# Patient Record
Sex: FEMALE | Race: BLACK OR AFRICAN AMERICAN | Employment: FULL TIME | URBAN - METROPOLITAN AREA
[De-identification: names, ages, dates, MRNs, and addresses within clinical notes are randomized per-mention and may not be internally consistent; named-entity substitution may affect disease eponyms.]

---

## 2019-01-29 PROBLEM — Z34.03 PRIMIGRAVIDA IN THIRD TRIMESTER: Status: ACTIVE | Noted: 2019-01-29

## 2019-02-05 PROBLEM — F12.90 MARIJUANA USE: Status: ACTIVE | Noted: 2019-02-05

## 2019-02-06 ENCOUNTER — HOSPITAL ENCOUNTER (EMERGENCY)
Age: 19
Discharge: HOME | DRG: 560 | End: 2019-02-07
Attending: EMERGENCY MEDICINE
Payer: MEDICAID

## 2019-02-06 DIAGNOSIS — O47.03 PREMATURE UTERINE CONTRACTIONS CAUSING THREATENED PREMATURE LABOR IN THIRD TRIMESTER: Primary | ICD-10-CM

## 2019-02-06 PROCEDURE — 81003 URINALYSIS AUTO W/O SCOPE: CPT | Performed by: EMERGENCY MEDICINE

## 2019-02-06 PROCEDURE — 81015 MICROSCOPIC EXAM OF URINE: CPT

## 2019-02-06 PROCEDURE — 99284 EMERGENCY DEPT VISIT MOD MDM: CPT | Performed by: EMERGENCY MEDICINE

## 2019-02-07 ENCOUNTER — HOSPITAL ENCOUNTER (INPATIENT)
Age: 19
LOS: 6 days | Discharge: HOME | DRG: 560 | End: 2019-02-13
Attending: OBSTETRICS & GYNECOLOGY | Admitting: OBSTETRICS & GYNECOLOGY
Payer: MEDICAID

## 2019-02-07 VITALS
SYSTOLIC BLOOD PRESSURE: 128 MMHG | WEIGHT: 153 LBS | OXYGEN SATURATION: 100 % | HEART RATE: 100 BPM | TEMPERATURE: 98 F | HEIGHT: 63 IN | DIASTOLIC BLOOD PRESSURE: 74 MMHG | BODY MASS INDEX: 27.11 KG/M2 | RESPIRATION RATE: 18 BRPM

## 2019-02-07 PROBLEM — O60.00 PRETERM LABOR: Status: ACTIVE | Noted: 2019-02-07

## 2019-02-07 LAB
ABO + RH BLD: NORMAL
ALBUMIN SERPL-MCNC: 2.4 G/DL (ref 3.5–5)
ALBUMIN/GLOB SERPL: 0.7 {RATIO}
ALP SERPL-CCNC: 219 U/L (ref 50–136)
ALT SERPL-CCNC: 12 U/L (ref 12–65)
AMPHET UR QL SCN: NEGATIVE
ANION GAP SERPL CALC-SCNC: 13 MMOL/L
APPEARANCE UR: ABNORMAL
AST SERPL-CCNC: 19 U/L (ref 15–37)
BACTERIA SPEC CULT: ABNORMAL
BACTERIA SPEC CULT: ABNORMAL
BACTERIA URNS QL MICRO: 0 /HPF
BACTERIA URNS QL MICRO: ABNORMAL /HPF
BARBITURATES UR QL SCN: NEGATIVE
BASOPHILS # BLD: 0 K/UL (ref 0–0.2)
BASOPHILS NFR BLD: 0 % (ref 0–2)
BENZODIAZ UR QL: NEGATIVE
BILIRUB SERPL-MCNC: 0.2 MG/DL (ref 0.2–1.1)
BILIRUB UR QL: NEGATIVE
BLOOD GROUP ANTIBODIES SERPL: NORMAL
BUN SERPL-MCNC: 7 MG/DL (ref 6–23)
CALCIUM SERPL-MCNC: 8.8 MG/DL (ref 8.3–10.4)
CANNABINOIDS UR QL SCN: POSITIVE
CASTS URNS QL MICRO: ABNORMAL /LPF
CHLORIDE SERPL-SCNC: 98 MMOL/L (ref 98–107)
CO2 SERPL-SCNC: 21 MMOL/L (ref 21–32)
COCAINE UR QL SCN: NEGATIVE
COLOR UR: YELLOW
CREAT SERPL-MCNC: 0.66 MG/DL (ref 0.6–1)
DIFFERENTIAL METHOD BLD: ABNORMAL
EOSINOPHIL # BLD: 0 K/UL (ref 0–0.8)
EOSINOPHIL NFR BLD: 0 % (ref 0.5–7.8)
EPI CELLS #/AREA URNS HPF: ABNORMAL /HPF
EPI CELLS #/AREA URNS HPF: ABNORMAL /HPF
ERYTHROCYTE [DISTWIDTH] IN BLOOD BY AUTOMATED COUNT: 14.9 % (ref 11.9–14.6)
GLOBULIN SER CALC-MCNC: 3.3 G/DL (ref 2.3–3.5)
GLUCOSE BLD STRIP.AUTO-MCNC: 193 MG/DL (ref 65–100)
GLUCOSE SERPL-MCNC: 380 MG/DL (ref 65–100)
GLUCOSE UR STRIP.AUTO-MCNC: NEGATIVE MG/DL
HCT VFR BLD AUTO: 37 % (ref 35.8–46.3)
HGB BLD-MCNC: 11.9 G/DL (ref 11.7–15.4)
HGB UR QL STRIP: ABNORMAL
IMM GRANULOCYTES # BLD AUTO: 0.1 K/UL (ref 0–0.5)
IMM GRANULOCYTES NFR BLD AUTO: 0 % (ref 0–5)
KETONES UR QL STRIP.AUTO: 40 MG/DL
LEUKOCYTE ESTERASE UR QL STRIP.AUTO: ABNORMAL
LYMPHOCYTES # BLD: 1.7 K/UL (ref 0.5–4.6)
LYMPHOCYTES NFR BLD: 14 % (ref 13–44)
MCH RBC QN AUTO: 25.4 PG (ref 26.1–32.9)
MCHC RBC AUTO-ENTMCNC: 32.2 G/DL (ref 31.4–35)
MCV RBC AUTO: 79.1 FL (ref 79.6–97.8)
METHADONE UR QL: NEGATIVE
MONOCYTES # BLD: 1.1 K/UL (ref 0.1–1.3)
MONOCYTES NFR BLD: 9 % (ref 4–12)
MUCOUS THREADS URNS QL MICRO: ABNORMAL /LPF
NEUTS SEG # BLD: 9.2 K/UL (ref 1.7–8.2)
NEUTS SEG NFR BLD: 76 % (ref 43–78)
NITRITE UR QL STRIP.AUTO: NEGATIVE
NRBC # BLD: 0 K/UL (ref 0–0.2)
OPIATES UR QL: NEGATIVE
OTHER OBSERVATIONS,UCOM: ABNORMAL
PCP UR QL: NEGATIVE
PH UR STRIP: 6.5 [PH] (ref 5–9)
PLATELET # BLD AUTO: 146 K/UL (ref 150–450)
PMV BLD AUTO: 13.3 FL (ref 9.4–12.3)
POTASSIUM SERPL-SCNC: 3.5 MMOL/L (ref 3.5–5.1)
PROT SERPL-MCNC: 5.7 G/DL
PROT UR STRIP-MCNC: ABNORMAL MG/DL
RBC # BLD AUTO: 4.68 M/UL (ref 4.05–5.2)
RBC #/AREA URNS HPF: ABNORMAL /HPF
SERVICE CMNT-IMP: ABNORMAL
SODIUM SERPL-SCNC: 132 MMOL/L (ref 136–145)
SP GR UR REFRACTOMETRY: 1.02 (ref 1–1.02)
SPECIMEN EXP DATE BLD: NORMAL
UROBILINOGEN UR QL STRIP.AUTO: 0.2 EU/DL (ref 0.2–1)
WBC # BLD AUTO: 12.1 K/UL (ref 4.3–11.1)
WBC URNS QL MICRO: ABNORMAL /HPF
WBC URNS QL MICRO: ABNORMAL /HPF

## 2019-02-07 PROCEDURE — 99218 HC RM OBSERVATION: CPT

## 2019-02-07 PROCEDURE — 96372 THER/PROPH/DIAG INJ SC/IM: CPT

## 2019-02-07 PROCEDURE — 74011000258 HC RX REV CODE- 258: Performed by: OBSTETRICS & GYNECOLOGY

## 2019-02-07 PROCEDURE — 59025 FETAL NON-STRESS TEST: CPT

## 2019-02-07 PROCEDURE — 82962 GLUCOSE BLOOD TEST: CPT

## 2019-02-07 PROCEDURE — 87077 CULTURE AEROBIC IDENTIFY: CPT

## 2019-02-07 PROCEDURE — 74011250636 HC RX REV CODE- 250/636

## 2019-02-07 PROCEDURE — 80053 COMPREHEN METABOLIC PANEL: CPT

## 2019-02-07 PROCEDURE — 65270000029 HC RM PRIVATE

## 2019-02-07 PROCEDURE — 87653 STREP B DNA AMP PROBE: CPT

## 2019-02-07 PROCEDURE — 85025 COMPLETE CBC W/AUTO DIFF WBC: CPT

## 2019-02-07 PROCEDURE — 74011250636 HC RX REV CODE- 250/636: Performed by: OBSTETRICS & GYNECOLOGY

## 2019-02-07 PROCEDURE — 76815 OB US LIMITED FETUS(S): CPT

## 2019-02-07 PROCEDURE — 80307 DRUG TEST PRSMV CHEM ANLYZR: CPT

## 2019-02-07 PROCEDURE — 87081 CULTURE SCREEN ONLY: CPT

## 2019-02-07 PROCEDURE — 81001 URINALYSIS AUTO W/SCOPE: CPT

## 2019-02-07 PROCEDURE — 86900 BLOOD TYPING SEROLOGIC ABO: CPT

## 2019-02-07 PROCEDURE — 76817 TRANSVAGINAL US OBSTETRIC: CPT

## 2019-02-07 PROCEDURE — 99283 EMERGENCY DEPT VISIT LOW MDM: CPT

## 2019-02-07 RX ORDER — BETAMETHASONE SODIUM PHOSPHATE AND BETAMETHASONE ACETATE 3; 3 MG/ML; MG/ML
12 INJECTION, SUSPENSION INTRA-ARTICULAR; INTRALESIONAL; INTRAMUSCULAR; SOFT TISSUE EVERY 24 HOURS
Status: COMPLETED | OUTPATIENT
Start: 2019-02-07 | End: 2019-02-08

## 2019-02-07 RX ORDER — SODIUM CHLORIDE 0.9 % (FLUSH) 0.9 %
5-40 SYRINGE (ML) INJECTION EVERY 8 HOURS
Status: DISCONTINUED | OUTPATIENT
Start: 2019-02-07 | End: 2019-02-11

## 2019-02-07 RX ORDER — INSULIN LISPRO 100 [IU]/ML
2 INJECTION, SOLUTION INTRAVENOUS; SUBCUTANEOUS ONCE
Status: COMPLETED | OUTPATIENT
Start: 2019-02-08 | End: 2019-02-08

## 2019-02-07 RX ORDER — ONDANSETRON 2 MG/ML
4 INJECTION INTRAMUSCULAR; INTRAVENOUS
Status: DISCONTINUED | OUTPATIENT
Start: 2019-02-07 | End: 2019-02-11

## 2019-02-07 RX ORDER — TERBUTALINE SULFATE 1 MG/ML
INJECTION SUBCUTANEOUS
Status: COMPLETED
Start: 2019-02-07 | End: 2019-02-07

## 2019-02-07 RX ORDER — ACETAMINOPHEN 325 MG/1
650 TABLET ORAL
Status: DISCONTINUED | OUTPATIENT
Start: 2019-02-07 | End: 2019-02-11

## 2019-02-07 RX ORDER — MAGNESIUM SULFATE HEPTAHYDRATE 40 MG/ML
4 INJECTION, SOLUTION INTRAVENOUS ONCE
Status: COMPLETED | OUTPATIENT
Start: 2019-02-07 | End: 2019-02-07

## 2019-02-07 RX ORDER — ACETAMINOPHEN 325 MG/1
650 TABLET ORAL
Status: DISCONTINUED | OUTPATIENT
Start: 2019-02-07 | End: 2019-02-07 | Stop reason: SDUPTHER

## 2019-02-07 RX ORDER — MAGNESIUM SULFATE HEPTAHYDRATE 40 MG/ML
2 INJECTION, SOLUTION INTRAVENOUS CONTINUOUS
Status: DISCONTINUED | OUTPATIENT
Start: 2019-02-07 | End: 2019-02-09

## 2019-02-07 RX ORDER — SODIUM CHLORIDE, SODIUM LACTATE, POTASSIUM CHLORIDE, CALCIUM CHLORIDE 600; 310; 30; 20 MG/100ML; MG/100ML; MG/100ML; MG/100ML
75 INJECTION, SOLUTION INTRAVENOUS CONTINUOUS
Status: DISCONTINUED | OUTPATIENT
Start: 2019-02-07 | End: 2019-02-09

## 2019-02-07 RX ORDER — SODIUM CHLORIDE, SODIUM LACTATE, POTASSIUM CHLORIDE, CALCIUM CHLORIDE 600; 310; 30; 20 MG/100ML; MG/100ML; MG/100ML; MG/100ML
125 INJECTION, SOLUTION INTRAVENOUS CONTINUOUS
Status: DISCONTINUED | OUTPATIENT
Start: 2019-02-07 | End: 2019-02-09

## 2019-02-07 RX ORDER — OXYCODONE AND ACETAMINOPHEN 5; 325 MG/1; MG/1
1 TABLET ORAL
Status: DISCONTINUED | OUTPATIENT
Start: 2019-02-07 | End: 2019-02-11

## 2019-02-07 RX ORDER — MAGNESIUM SULFATE HEPTAHYDRATE 40 MG/ML
2 INJECTION, SOLUTION INTRAVENOUS ONCE
Status: DISCONTINUED | OUTPATIENT
Start: 2019-02-07 | End: 2019-02-07

## 2019-02-07 RX ORDER — SODIUM CHLORIDE 0.9 % (FLUSH) 0.9 %
5-40 SYRINGE (ML) INJECTION AS NEEDED
Status: DISCONTINUED | OUTPATIENT
Start: 2019-02-07 | End: 2019-02-11

## 2019-02-07 RX ORDER — TERBUTALINE SULFATE 1 MG/ML
0.25 INJECTION SUBCUTANEOUS
Status: COMPLETED | OUTPATIENT
Start: 2019-02-07 | End: 2019-02-07

## 2019-02-07 RX ORDER — INSULIN LISPRO 100 [IU]/ML
INJECTION, SOLUTION INTRAVENOUS; SUBCUTANEOUS
Status: DISCONTINUED | OUTPATIENT
Start: 2019-02-08 | End: 2019-02-11

## 2019-02-07 RX ORDER — ZOLPIDEM TARTRATE 5 MG/1
5 TABLET ORAL
Status: DISCONTINUED | OUTPATIENT
Start: 2019-02-07 | End: 2019-02-11

## 2019-02-07 RX ADMIN — TERBUTALINE SULFATE: 1 INJECTION SUBCUTANEOUS at 18:54

## 2019-02-07 RX ADMIN — MAGNESIUM SULFATE HEPTAHYDRATE 2 G/HR: 40 INJECTION, SOLUTION INTRAVENOUS at 21:08

## 2019-02-07 RX ADMIN — TERBUTALINE SULFATE 0.25 MG: 1 INJECTION SUBCUTANEOUS at 21:36

## 2019-02-07 RX ADMIN — BETAMETHASONE SODIUM PHOSPHATE AND BETAMETHASONE ACETATE 12 MG: 3; 3 INJECTION, SUSPENSION INTRA-ARTICULAR; INTRALESIONAL; INTRAMUSCULAR at 20:36

## 2019-02-07 RX ADMIN — MAGNESIUM SULFATE IN WATER 4 G: 40 INJECTION, SOLUTION INTRAVENOUS at 20:36

## 2019-02-07 RX ADMIN — AMPICILLIN SODIUM 2 G: 2 INJECTION, POWDER, FOR SOLUTION INTRAMUSCULAR; INTRAVENOUS at 20:35

## 2019-02-07 NOTE — ED TRIAGE NOTES
Pt ambulatory to room 18, reports 34 weeks gestation of first pregnancy, lower abdominal pain, pain comes every 10 minutes and lasts for 1 minute. Pt states \"my mucous plug came out last night at the middle of the night, like 3 or 4 in the morning. It was yellow with red strings in it\". VSS in triage, tachycardia at 122. OBGYN is [facility], pt states \"I called them to tell them but the nurse never called me back\". Dr Louise Burden consulted, verbal orders provided.

## 2019-02-07 NOTE — PROGRESS NOTES
Pt to triage with complaints of contractions every 5 minutes. Pt was seen last night in [facility] ED and stated they told her \" I can't feel anything. \" Pt has since started dianna more often and pain is now 9/10. Pt noticed bright red discharge on tissue after wiping. Dr Eladio Patton notified.

## 2019-02-07 NOTE — DISCHARGE INSTRUCTIONS
Patient Education     Go straight to [facility], [employer] [facility] report to labor and delivery where they can do uterine monitoring    For future reference during this pregnancy you should always go to [facility] since that is where they performed deliveries,   Our OB equipment and abilities are very limited at [facility] [address] contractions prepare your uterus for labor. Think of them as a \"warm-up\" exercise that your body does. You may begin to feel them between the 28th and 30th weeks of your pregnancy. But they start as early as the 20th week. [place] contractions usually occur more often during the ninth month. They may go away when you are active and return when you rest. These contractions are like mild contractions of true labor, but they occur less often. (You feel fewer than 8 in an hour.) They don't cause your cervix to open. It may be hard for you to tell the difference between [place] contractions and true labor, especially in your first pregnancy. Follow-up care is a key part of your treatment and safety. Be sure to make and go to all appointments, and call your doctor if you are having problems. It's also a good idea to know your test results and keep a list of the medicines you take. How can you care for yourself at home? · Try a warm bath to help relieve muscle tension and reduce pain. · Change positions every 30 minutes. Take breaks if you must sit for a long time. Get up and walk around. · Drink plenty of water, enough so that your urine is light yellow or clear like water. · Taking short walks may help you feel better. Your doctor needs to check any contractions that are getting stronger or closer together. Where can you learn more? Go to http://rama-gilbert.info/.   Enter [phone] in the search box to learn more about \"[place] Contractions: Care Instructions. \"  Current as of: September 5, 2018  Content Version: 11.9  © 8302-9272 [employer], [facility]. Care instructions adapted under license by [employer] (which disclaims liability or warranty for this information). If you have questions about a medical condition or this instruction, always ask your healthcare professional. [place] [address] any warranty or liability for your use of this information.

## 2019-02-07 NOTE — PROGRESS NOTES
Dr Jeanne Tom to the bedside. SVE closed per MD. VO for brethine 0.25mg SQ now. US to bedside for ANJEL. ANJEL 15cm per MD. Vaginal US completed also, CL 1.5cm 
 
 
1900- Bedside report given to Ama Mortensen RN. Brethine given. See MAR. MD ordered FFN. RN reminded MD that results would be invalid since SVE was just done prior to collection.

## 2019-02-07 NOTE — ED NOTES
I have reviewed discharge instructions with the patient. The patient verbalized understanding. Patient left ED via Discharge Method: ambulatory to driving to [facility] with boyfriend, driving patient. Opportunity for questions and clarification provided. Patient given 0 scripts. To continue your aftercare when you leave the hospital, you may receive an automated call from our care team to check in on how you are doing. This is a free service and part of our promise to provide the best care and service to meet your aftercare needs.  If you have questions, or wish to unsubscribe from this service please call [phone]. Thank you for Choosing our [employer] [facility] Department.

## 2019-02-08 PROBLEM — O36.5990 PREGNANCY AFFECTED BY FETAL GROWTH RESTRICTION: Status: ACTIVE | Noted: 2019-02-08

## 2019-02-08 LAB
ALBUMIN SERPL-MCNC: 2.7 G/DL (ref 3.5–5)
ALBUMIN/GLOB SERPL: 0.7 {RATIO}
ALP SERPL-CCNC: 259 U/L (ref 50–136)
ALT SERPL-CCNC: 16 U/L (ref 12–65)
ANION GAP SERPL CALC-SCNC: 11 MMOL/L
AST SERPL-CCNC: 20 U/L (ref 15–37)
BILIRUB SERPL-MCNC: 0.2 MG/DL (ref 0.2–1.1)
BUN SERPL-MCNC: 6 MG/DL (ref 6–23)
CALCIUM SERPL-MCNC: 8.1 MG/DL (ref 8.3–10.4)
CHLORIDE SERPL-SCNC: 106 MMOL/L (ref 98–107)
CO2 SERPL-SCNC: 20 MMOL/L (ref 21–32)
CREAT SERPL-MCNC: 0.83 MG/DL (ref 0.6–1)
GLOBULIN SER CALC-MCNC: 4 G/DL (ref 2.3–3.5)
GLUCOSE BLD STRIP.AUTO-MCNC: 128 MG/DL (ref 65–100)
GLUCOSE BLD STRIP.AUTO-MCNC: 138 MG/DL (ref 65–100)
GLUCOSE BLD STRIP.AUTO-MCNC: 156 MG/DL (ref 65–100)
GLUCOSE BLD STRIP.AUTO-MCNC: 293 MG/DL (ref 65–100)
GLUCOSE BLD STRIP.AUTO-MCNC: 97 MG/DL (ref 65–100)
GLUCOSE SERPL-MCNC: 155 MG/DL (ref 65–100)
MAGNESIUM SERPL-MCNC: 5.2 MG/DL (ref 1.8–2.4)
MAGNESIUM SERPL-MCNC: 5.6 MG/DL (ref 1.8–2.4)
MAGNESIUM SERPL-MCNC: 5.7 MG/DL (ref 1.8–2.4)
MAGNESIUM SERPL-MCNC: 5.8 MG/DL (ref 1.8–2.4)
POTASSIUM SERPL-SCNC: 3.3 MMOL/L (ref 3.5–5.1)
PROT SERPL-MCNC: 6.7 G/DL
SODIUM SERPL-SCNC: 137 MMOL/L (ref 136–145)

## 2019-02-08 PROCEDURE — 76811 OB US DETAILED SNGL FETUS: CPT | Performed by: OBSTETRICS & GYNECOLOGY

## 2019-02-08 PROCEDURE — 74011636637 HC RX REV CODE- 636/637: Performed by: OBSTETRICS & GYNECOLOGY

## 2019-02-08 PROCEDURE — 74011250636 HC RX REV CODE- 250/636: Performed by: OBSTETRICS & GYNECOLOGY

## 2019-02-08 PROCEDURE — 74011000258 HC RX REV CODE- 258: Performed by: OBSTETRICS & GYNECOLOGY

## 2019-02-08 PROCEDURE — 82962 GLUCOSE BLOOD TEST: CPT

## 2019-02-08 PROCEDURE — 36415 COLL VENOUS BLD VENIPUNCTURE: CPT

## 2019-02-08 PROCEDURE — 65270000029 HC RM PRIVATE

## 2019-02-08 PROCEDURE — 74011000250 HC RX REV CODE- 250: Performed by: OBSTETRICS & GYNECOLOGY

## 2019-02-08 PROCEDURE — 76828 ECHO EXAM OF FETAL HEART: CPT | Performed by: OBSTETRICS & GYNECOLOGY

## 2019-02-08 PROCEDURE — 83735 ASSAY OF MAGNESIUM: CPT

## 2019-02-08 PROCEDURE — 76819 FETAL BIOPHYS PROFIL W/O NST: CPT | Performed by: OBSTETRICS & GYNECOLOGY

## 2019-02-08 RX ADMIN — BETAMETHASONE SODIUM PHOSPHATE AND BETAMETHASONE ACETATE 12 MG: 3; 3 INJECTION, SUSPENSION INTRA-ARTICULAR; INTRALESIONAL; INTRAMUSCULAR at 20:25

## 2019-02-08 RX ADMIN — MAGNESIUM SULFATE HEPTAHYDRATE 2 G/HR: 40 INJECTION, SOLUTION INTRAVENOUS at 18:22

## 2019-02-08 RX ADMIN — AMPICILLIN SODIUM 2 G: 2 INJECTION, POWDER, FOR SOLUTION INTRAMUSCULAR; INTRAVENOUS at 02:29

## 2019-02-08 RX ADMIN — MAGNESIUM SULFATE HEPTAHYDRATE 2 G/HR: 40 INJECTION, SOLUTION INTRAVENOUS at 07:30

## 2019-02-08 RX ADMIN — FAMOTIDINE 20 MG: 10 INJECTION INTRAVENOUS at 17:59

## 2019-02-08 RX ADMIN — INSULIN LISPRO 6 UNITS: 100 INJECTION, SOLUTION INTRAVENOUS; SUBCUTANEOUS at 11:20

## 2019-02-08 RX ADMIN — AMPICILLIN SODIUM 2 G: 2 INJECTION, POWDER, FOR SOLUTION INTRAMUSCULAR; INTRAVENOUS at 23:09

## 2019-02-08 RX ADMIN — FAMOTIDINE 20 MG: 10 INJECTION INTRAVENOUS at 09:36

## 2019-02-08 RX ADMIN — AMPICILLIN SODIUM 2 G: 2 INJECTION, POWDER, FOR SOLUTION INTRAMUSCULAR; INTRAVENOUS at 09:36

## 2019-02-08 RX ADMIN — AMPICILLIN SODIUM 2 G: 2 INJECTION, POWDER, FOR SOLUTION INTRAMUSCULAR; INTRAVENOUS at 16:39

## 2019-02-08 RX ADMIN — SODIUM CHLORIDE, SODIUM LACTATE, POTASSIUM CHLORIDE, AND CALCIUM CHLORIDE 75 ML/HR: 600; 310; 30; 20 INJECTION, SOLUTION INTRAVENOUS at 00:05

## 2019-02-08 RX ADMIN — INSULIN LISPRO 2 UNITS: 100 INJECTION, SOLUTION INTRAVENOUS; SUBCUTANEOUS at 00:06

## 2019-02-08 NOTE — PROGRESS NOTES
Dr. Marry Puga called and updated that pt blood glucose levels were elevated to 380 and 193 fingerstick.  Orders to repeat labs per MD.

## 2019-02-08 NOTE — H&P
History & Physical 
 
Name: Kayla Ro MRN: [id number]  SSN: [id number] YOB: 2000  Age: 22 y.o. Sex: female Subjective: Pt presents with painful uterine ctx. She notes good FM. She denies VB, LOF, UTI or PEC symptoms. Estimated Date of Delivery: 3/18/19 OB History  Para Term  AB Living 1 0 0 0 0 0 SAB TAB Ectopic Molar Multiple Live Births  
0 0 0   0 # Outcome Date GA Lbr Juan José/2nd Weight Sex Delivery Anes PTL Lv  
1 Current Ms. Anjelica Montes is seen with pregnancy at 34w3d for uterine ctx. Prenatal course is c/b late transfer of care and marijuana use. Diana Morris Please see prenatal records for details. Past Medical History:  
Diagnosis Date  Allergic rhinitis  Eczema History reviewed. No pertinent surgical history. Social History Occupational History  Not on file Tobacco Use  Smoking status: Light Tobacco Smoker  Smokeless tobacco: Never Used  Tobacco comment: 19 pt was smoking 6x/daily at beg of pregnancy. now down to 2. Substance and Sexual Activity  Alcohol use: No  
  Frequency: Never  Drug use: No  
 Sexual activity: Yes  
  Partners: Male Birth control/protection: None Family History Problem Relation Age of Onset  Hypertension Mother  Diabetes Father No Known Allergies Prior to Admission medications Medication Sig Start Date End Date Taking? Authorizing Provider PNV#[id number] Fum & PS-FA-OM-3 35-1-200 mg cap Take  by mouth. Provider, Historical  
ferrous sulfate ER (IRON) 160 mg (50 mg iron) TbER tablet Take 1 Tab by mouth daily. Provider, Historical  
  
 
Review of Systems: 
Constitutional:No headache, fever Cardiac:   No chest pain Resp: No cough or shortness of breath GI:   No nausea/vomiting, diarrhea, abdominal pain :   No dysuria Neuro:     No vision changes, headache Objective:  
 
Vitals: 
Vitals:  
 19 1832 19 1834 BP:  141/67 Pulse:  (!) 104 Resp:  18 Temp:  98 °F (36.7 °C) Weight: 70.3 kg (155 lb) Height: 5' 1\" (1.549 m) Physical Exam: 
Patient with distress. Heart: RRR Lung: CTA Back: no CVAT Abdomen: soft, NT Fundus: NT 
Cervical Exam: TFT/60% Lower Extremities: no evidence of DVT Membranes:  intact Fetal Heart Rate: Baseline: 150 per minute Variability: moderate Accelerations: yes Decelerations: no 
Uterine contractions: every 2-4 minutes Abd ultrasound: vtx; ANJEL - 15cm Vaginal ultrasound: CL - 1.5cm Prenatal Labs:  
Lab Results Component Value Date/Time  
 Rubella, External Immune 10/08/2018 HBsAg, External Negative 10/08/2018 HIV, External Negative 10/08/2018 RPR, External Negative 10/08/2018 Gonorrhea, External Negative 2018 Chlamydia, External Negative 2018 Assessment/Plan: Ms. Didi Rodriguez is a  seen with pregnancy at 34w3d for PTL. No results found for: GRBSEXT Plan:  
 
Admit for Magnesium sulfate/steroids/antibiotics Patient discussed with Dr. Philip Zapata.

## 2019-02-08 NOTE — PROGRESS NOTES
Dr. Francheska Tom called to nurse's desk. Orders for 2 units Humalog now, sliding scale to be initiated and D5 to be dc and LR to be started at 75ml hrs. Blood glucose to be taken 1 hr Postprandial and fasting.

## 2019-02-08 NOTE — PROGRESS NOTES
High Risk Obstetrics Progress Note Name: Chaitanya Walters MRN: [id number]  SSN: [id number] YOB: 2000  Age: 22 y.o. Sex: female Subjective: LOS: 1 day Estimated Date of Delivery: 3/18/19 Gestational Age Today: 31w1d Patient admitted for  labor. States she is feeling good fetal movement. She denies any pelvic pressure or feeling contractions at this time. Denies LOF or VB. Objective:  
 
Vitals:  Blood pressure 113/56, pulse (!) 101, temperature 97.7 °F (36.5 °C), resp. rate 18, height 5' 1\" (1.549 m), weight 155 lb (70.3 kg), last menstrual period 2018, SpO2 99 %. Temp (24hrs), Av.9 °F (36.6 °C), Min:97.7 °F (36.5 °C), Max:98 °F (36.7 °C) Systolic (09COB), UOS:248 , Min:100 , Max:150 Diastolic (20KCX), PSU:08, Min:53, Max:93 Intake and Output:    
  
 
Physical Exam: 
Patient without distress. Heart: Regular rate Lung: normal respiratory effort Abdomen: soft, nontender, gravid Fundus: soft and non tender Cervical Exam: See SVE from admission (1cm) Lower Extremities:  - Edema No, Reflexes 2+ BL Membranes:  Intact Uterine Activity:  None Fetal Heart Rate:  Reactive Labs:  
Recent Results (from the past 36 hour(s)) TYPE & SCREEN Collection Time: 19  8:54 PM  
Result Value Ref Range Crossmatch Expiration 02/10/2019 ABO/Rh(D) B POSITIVE Antibody screen NEG   
CBC WITH AUTOMATED DIFF Collection Time: 19  8:54 PM  
Result Value Ref Range WBC 12.1 (H) 4.3 - 11.1 K/uL  
 RBC 4.68 4.05 - 5.2 M/uL  
 HGB 11.9 11.7 - 15.4 g/dL HCT 37.0 35.8 - 46.3 % MCV 79.1 (L) 79.6 - 97.8 FL  
 MCH 25.4 (L) 26.1 - 32.9 PG  
 MCHC 32.2 31.4 - 35.0 g/dL  
 RDW 14.9 (H) 11.9 - 14.6 % PLATELET 520 (L) 949 - 450 K/uL MPV 13.3 (H) 9.4 - 12.3 FL ABSOLUTE NRBC 0.00 0.0 - 0.2 K/uL  
 DF AUTOMATED NEUTROPHILS 76 43 - 78 % LYMPHOCYTES 14 13 - 44 %  MONOCYTES 9 4.0 - 12.0 %  
 EOSINOPHILS 0 (L) 0.5 - 7.8 % BASOPHILS 0 0.0 - 2.0 % IMMATURE GRANULOCYTES 0 0.0 - 5.0 %  
 ABS. NEUTROPHILS 9.2 (H) 1.7 - 8.2 K/UL  
 ABS. LYMPHOCYTES 1.7 0.5 - 4.6 K/UL  
 ABS. MONOCYTES 1.1 0.1 - 1.3 K/UL  
 ABS. EOSINOPHILS 0.0 0.0 - 0.8 K/UL  
 ABS. BASOPHILS 0.0 0.0 - 0.2 K/UL  
 ABS. IMM. GRANS. 0.1 0.0 - 0.5 K/UL  
GRP B STREP SCRN BY PCR Collection Time: 02/07/19  8:55 PM  
Result Value Ref Range Special Requests: NO SPECIAL REQUESTS Culture result: (A) POSITIVE: GBS target nucleic acid is detected. Presumptive for GBS colonization. Culture result: RESULTS VERIFIED, PHONED TO AND READ BACK BY 
GAMA SOTELO TO NAYLA ON Number [email or web address] DR[employer] SCREEN, URINE Collection Time: 02/07/19  9:23 PM  
Result Value Ref Range PCP(PHENCYCLIDINE) NEGATIVE BENZODIAZEPINES NEGATIVE     
 COCAINE NEGATIVE AMPHETAMINES NEGATIVE METHADONE NEGATIVE     
 THC (TH-CANNABINOL) POSITIVE    
 OPIATES NEGATIVE     
 BARBITURATES NEGATIVE URINALYSIS W/ RFLX MICROSCOPIC Collection Time: 02/07/19  9:23 PM  
Result Value Ref Range Color YELLOW Appearance CLOUDY Specific gravity 1.020 1.001 - 1.023    
 pH (UA) 6.5 5.0 - 9.0 Protein TRACE (A) NEG mg/dL Glucose NEGATIVE  mg/dL Ketone 40 (A) NEG mg/dL Bilirubin NEGATIVE  NEG Blood SMALL (A) NEG Urobilinogen 0.2 0.2 - 1.0 EU/dL Nitrites NEGATIVE  NEG Leukocyte Esterase SMALL (A) NEG    
 WBC 3-5 0 /hpf Epithelial cells 5-10 0 /hpf Bacteria 0 0 /hpf Other observations RESULTS VERIFIED MANUALLY Mucus TRACE 0 /lpf METABOLIC PANEL, COMPREHENSIVE Collection Time: 02/07/19  9:31 PM  
Result Value Ref Range Sodium 132 (L) 136 - 145 mmol/L Potassium 3.5 3.5 - 5.1 mmol/L Chloride 98 98 - 107 mmol/L  
 CO2 21 21 - 32 mmol/L Anion gap 13 mmol/L Glucose 380 (H) 65 - 100 mg/dL BUN 7 6 - 23 MG/DL  Creatinine 0.66 0.6 - 1.0 MG/DL  
 GFR est AA >60 >60 ml/min/1.73m2 GFR est non-AA >60 ml/min/1.73m2 Calcium 8.8 8.3 - 10.4 MG/DL Bilirubin, total 0.2 0.2 - 1.1 MG/DL  
 ALT (SGPT) 12 12 - 65 U/L  
 AST (SGOT) 19 15 - 37 U/L Alk. phosphatase 219 (H) 50 - 136 U/L Protein, total 5.7 g/dL Albumin 2.4 (L) 3.5 - 5.0 g/dL Globulin 3.3 2.3 - 3.5 g/dL A-G Ratio 0.7 GLUCOSE, POC Collection Time: 19 10:33 PM  
Result Value Ref Range Glucose (POC) 193 (H) 65 - 100 mg/dL METABOLIC PANEL, COMPREHENSIVE Collection Time: 19 11:23 PM  
Result Value Ref Range Sodium 137 136 - 145 mmol/L Potassium 3.3 (L) 3.5 - 5.1 mmol/L Chloride 106 98 - 107 mmol/L  
 CO2 20 (L) 21 - 32 mmol/L Anion gap 11 mmol/L Glucose 155 (H) 65 - 100 mg/dL BUN 6 6 - 23 MG/DL Creatinine 0.83 0.6 - 1.0 MG/DL  
 GFR est AA >60 >60 ml/min/1.73m2 GFR est non-AA >60 ml/min/1.73m2 Calcium 8.1 (L) 8.3 - 10.4 MG/DL Bilirubin, total 0.2 0.2 - 1.1 MG/DL  
 ALT (SGPT) 16 12 - 65 U/L  
 AST (SGOT) 20 15 - 37 U/L Alk. phosphatase 259 (H) 50 - 136 U/L Protein, total 6.7 g/dL Albumin 2.7 (L) 3.5 - 5.0 g/dL Globulin 4.0 (H) 2.3 - 3.5 g/dL A-G Ratio 0.7 MAGNESIUM Collection Time: 19  2:39 AM  
Result Value Ref Range Magnesium 5.2 (HH) 1.8 - 2.4 mg/dL GLUCOSE, POC Collection Time: 19  7:26 AM  
Result Value Ref Range Glucose (POC) 138 (H) 65 - 100 mg/dL MAGNESIUM Collection Time: 19  8:54 AM  
Result Value Ref Range Magnesium 5.7 (HH) 1.8 - 2.4 mg/dL GLUCOSE, POC Collection Time: 19 10:46 AM  
Result Value Ref Range Glucose (POC) 293 (H) 65 - 100 mg/dL Assessment and Plan: Active Problems: 
   labor (2019)  Labor:  Pt on magnesium and will get her 2nd dose of steroids this evening. No labor complaints at this time. Will continue to monitor closely.    
MFM performed growth US, spoke with Dr. Bethany Martinez who states IUGR present, BPP 6/8, normal fluid. No change to current plan for continued observation at this time. Pt had significantly elevated glucose level on her CMP with admission, currently getting sliding scale insulin. Will continue to monitor. Signed By: Ian Soto MD   
 February 8, 2019

## 2019-02-08 NOTE — PROGRESS NOTES
Chart reviewed. 22 y.o. , 34w3d, just recently transferred to [facility] from [facility] Seen @ [facility] last night for contractions, sent home. Last sex ~ 2 nights ago. This evening the contractions required, were ~ every 5 minutes. Denies LOF. Saw some \"light pink\" after wiping. Complaining now of increased pressure. Problem List  Date Reviewed: 2019 Codes Class Noted  labor ICD-10-CM: O60.00 ICD-9-CM: 644.00  2019 Marijuana use ICD-10-CM: F12.90 ICD-9-CM: 305.20  2019 Overview Signed 2019  1:14 PM by Naveed Perry MD  
  35 w:   UDS is + for marijuana. She will have f/u UDSs. SW suggests she attend:   \"[facility]:   [facility]. org   [phone]  [facility]. org     
Comprehensive parenting program for pts 13-23 yo:  Classes, mentoring and support. Connecting young parents to local resources, learns agout:  Pregnancy, parenting and life skills, earns diapers, wipes, baby equipment, houshold supplies Or: 
Healthy Families: A service for family's who are pregnant or have  babies Professionally trained home visitors provide information so that you as parents can provide the best for your new baby. This program provides: 
1. Weekly visits with your  in your own home 2. Support through your pregnancy and  the early years of your baby's life 3. Ways to make your home safe for your baby 4. Information on how to care for your baby 5. Facts that will let you know your baby is growing and developing in healthy ways 6. Activities that you and your baby can enjoy together 7. Access to other community services Primigravida in third trimester ICD-10-CM: Z34.03 
ICD-9-CM: V22.0  2019 Overview Addendum 2019  7:46 PM by Naveed Perry MD  
  Transfer @ 33 wk from [facility]. Reviewed MR:  Declined flu vaccine. Late PNC, initial prenatal visit 18 wk. Smokes 2-3 cig/d.   Glucola (18) 102.  26 wk US  EFW 2 lb 3 oz, 53%. Smells like marijuana, check UDS. Boy Declined flu vacc (see Samira's note) Allergic rhinitis ICD-10-CM: J30.9 ICD-9-CM: 477.9  Unknown Eczema ICD-10-CM: L30.9 ICD-9-CM: 692.9  Unknown Patient Vitals for the past 12 hrs: 
 Temp Pulse Resp BP  
02/07/19 1834 98 °F (36.7 °C) (!) 104 18 141/67 Cervix:  LFT/80/-2,  No blood on my glove Membranes:   intact FHTs:   Baseline 150s w/ fair variability. Contractions irreg, some q 5min with irritability between. Anticipates a boy A/P: 
1. Subtle cervical change since admission, pt appears uncomfortable. Mag, steroids, abx.

## 2019-02-08 NOTE — PROCEDURES
Portable Ultrasound Procedure Report    Reason for Ultrasound-  labor, Limited prenatal care. Requesting RACHEL Ayala    Ultrasound performed at bedside for evaluation of pregnancy. Ultrasound Type: Transabdominal    Findings: Normal fetal anatomy, fetal growth restriction, normal uterus, and placenta. Reassuring fetal status. See detailed report in CC. Estimated Fetal Weight:  1997 grams   Estimated Gestational Age by Ultrasound: 33 weeks 3 days, Fetal growth restriction present. AC-3.8th%tile  Fetal Position: Vertex  Amniotic Fluid: Normal, Amniotic Fluid Index was 19.3 centimeters. BPP-6/8, absent gross body movements. Umbilical Artery Doppler study: Normal S/D ratio. Placenta: Anterior   Fetus: Normal detailed anatomy survey.

## 2019-02-08 NOTE — PROGRESS NOTES
Dr. Robb Beard on phone, orders for ambien 5 mg PO QHS PRN, Tylenol 650 mg Q6 PRN, Pepcid 20 mg BID PRN received. If UC's have not decreased and pt still continue to c/o pain in 2 hours from now given another 2 gm magnesium bolus per Dr. Robb Beard.

## 2019-02-09 LAB
GLUCOSE BLD STRIP.AUTO-MCNC: 107 MG/DL (ref 65–100)
GLUCOSE BLD STRIP.AUTO-MCNC: 113 MG/DL (ref 65–100)
GLUCOSE BLD STRIP.AUTO-MCNC: 83 MG/DL (ref 65–100)
MAGNESIUM SERPL-MCNC: 6 MG/DL (ref 1.8–2.4)
MAGNESIUM SERPL-MCNC: 6.1 MG/DL (ref 1.8–2.4)

## 2019-02-09 PROCEDURE — 83735 ASSAY OF MAGNESIUM: CPT

## 2019-02-09 PROCEDURE — 74011000258 HC RX REV CODE- 258: Performed by: OBSTETRICS & GYNECOLOGY

## 2019-02-09 PROCEDURE — 65270000029 HC RM PRIVATE

## 2019-02-09 PROCEDURE — 74011250637 HC RX REV CODE- 250/637: Performed by: OBSTETRICS & GYNECOLOGY

## 2019-02-09 PROCEDURE — 74011000250 HC RX REV CODE- 250: Performed by: OBSTETRICS & GYNECOLOGY

## 2019-02-09 PROCEDURE — 74011250636 HC RX REV CODE- 250/636: Performed by: OBSTETRICS & GYNECOLOGY

## 2019-02-09 PROCEDURE — 82962 GLUCOSE BLOOD TEST: CPT

## 2019-02-09 PROCEDURE — 36415 COLL VENOUS BLD VENIPUNCTURE: CPT

## 2019-02-09 RX ORDER — NIFEDIPINE 30 MG/1
30 TABLET, EXTENDED RELEASE ORAL EVERY 12 HOURS
Status: DISCONTINUED | OUTPATIENT
Start: 2019-02-09 | End: 2019-02-11

## 2019-02-09 RX ORDER — BUTORPHANOL TARTRATE 2 MG/ML
1 INJECTION INTRAMUSCULAR; INTRAVENOUS
Status: DISCONTINUED | OUTPATIENT
Start: 2019-02-09 | End: 2019-02-11

## 2019-02-09 RX ORDER — MAGNESIUM SULFATE HEPTAHYDRATE 40 MG/ML
2 INJECTION, SOLUTION INTRAVENOUS CONTINUOUS
Status: DISCONTINUED | OUTPATIENT
Start: 2019-02-09 | End: 2019-02-11

## 2019-02-09 RX ORDER — TERBUTALINE SULFATE 1 MG/ML
0.25 INJECTION SUBCUTANEOUS
Status: COMPLETED | OUTPATIENT
Start: 2019-02-09 | End: 2019-02-09

## 2019-02-09 RX ORDER — BUTORPHANOL TARTRATE 2 MG/ML
1 INJECTION INTRAMUSCULAR; INTRAVENOUS
Status: COMPLETED | OUTPATIENT
Start: 2019-02-09 | End: 2019-02-09

## 2019-02-09 RX ORDER — SODIUM CHLORIDE, SODIUM LACTATE, POTASSIUM CHLORIDE, CALCIUM CHLORIDE 600; 310; 30; 20 MG/100ML; MG/100ML; MG/100ML; MG/100ML
75 INJECTION, SOLUTION INTRAVENOUS CONTINUOUS
Status: DISCONTINUED | OUTPATIENT
Start: 2019-02-09 | End: 2019-02-11

## 2019-02-09 RX ADMIN — PROMETHAZINE HYDROCHLORIDE 25 MG: 25 INJECTION INTRAMUSCULAR; INTRAVENOUS at 18:50

## 2019-02-09 RX ADMIN — MAGNESIUM SULFATE HEPTAHYDRATE 2 G/HR: 40 INJECTION, SOLUTION INTRAVENOUS at 03:02

## 2019-02-09 RX ADMIN — NIFEDIPINE 30 MG: 30 TABLET, FILM COATED, EXTENDED RELEASE ORAL at 17:53

## 2019-02-09 RX ADMIN — ZOLPIDEM TARTRATE 5 MG: 5 TABLET ORAL at 01:33

## 2019-02-09 RX ADMIN — BUTORPHANOL TARTRATE 1 MG: 2 INJECTION, SOLUTION INTRAMUSCULAR; INTRAVENOUS at 18:50

## 2019-02-09 RX ADMIN — AMPICILLIN SODIUM 2 G: 2 INJECTION, POWDER, FOR SOLUTION INTRAMUSCULAR; INTRAVENOUS at 19:36

## 2019-02-09 RX ADMIN — MAGNESIUM SULFATE HEPTAHYDRATE 2 G/HR: 40 INJECTION, SOLUTION INTRAVENOUS at 20:50

## 2019-02-09 RX ADMIN — AMPICILLIN SODIUM 2 G: 2 INJECTION, POWDER, FOR SOLUTION INTRAMUSCULAR; INTRAVENOUS at 04:50

## 2019-02-09 RX ADMIN — TERBUTALINE SULFATE 0.25 MG: 1 INJECTION SUBCUTANEOUS at 21:32

## 2019-02-09 RX ADMIN — OXYCODONE AND ACETAMINOPHEN 1 TABLET: 5; 325 TABLET ORAL at 20:56

## 2019-02-09 NOTE — PROGRESS NOTES
Dr. Gonzalez Mount on phone. Orders received for stadol 1 mg and phenergan 25 mg SIVP now. If contractions do not resolve in the next 30 minutes restart magnesium at 2g/hr and restart antibiotics.

## 2019-02-09 NOTE — PROGRESS NOTES
High Risk Obstetrics Progress Note Name: Huong Tobin MRN: [id number]  SSN: [id number] YOB: 2000  Age: 22 y.o. Sex: female Subjective: LOS: 2 days Estimated Date of Delivery: 3/18/19 Gestational Age Today: 35w7d Patient admitted for  labor. States she is feeling good fetal movement. She denies any pelvic pressure or feeling contractions at this time. Denies LOF or VB. Objective:  
 
Vitals:  Blood pressure 128/61, pulse 71, temperature 97.7 °F (36.5 °C), resp. rate 18, height 5' 1\" (1.549 m), weight 155 lb (70.3 kg), last menstrual period 2018, SpO2 100 %. Temp (24hrs), Av °F (36.7 °C), Min:97.7 °F (36.5 °C), Max:98.2 °F (36.8 °C) Systolic (06RYP), WGB:833 , Min:119 , Max:137 Diastolic (23OTX), RQU:25, Min:56, Max:83 Intake and Output:    
Date 19 0700 - 02/10/19 3702 Shift 0288-2290 9204-9217 1576-4800 24 Hour Total  
INTAKE  
P.O. 355   355  
I.V.(mL/kg/hr) 2331.7(4.1)   2331.7 Shift Total(mL/kg) 2686. 7(38.2)   2686. 7(38.2) OUTPUT Urine(mL/kg/hr) 1800(3.2)   1800 Shift Total(mL/kg) 1800(25.6)   1800(25.6) Weight (kg) 70.3 70.3 70.3 70.3 Physical Exam: 
Patient without distress. Heart: Regular rate Lung: normal respiratory effort Abdomen: soft, nontender, gravid Fundus: soft and non tender Cervical Exam: See SVE from admission (1cm) Lower Extremities:  - Edema No, Reflexes 2+ BL Membranes:  Intact Uterine Activity:  None Fetal Heart Rate:  Reactive Labs:  
Recent Results (from the past 36 hour(s)) GLUCOSE, POC Collection Time: 19  7:26 AM  
Result Value Ref Range Glucose (POC) 138 (H) 65 - 100 mg/dL MAGNESIUM Collection Time: 19  8:54 AM  
Result Value Ref Range Magnesium 5.7 (HH) 1.8 - 2.4 mg/dL GLUCOSE, POC Collection Time: 19 10:46 AM  
Result Value Ref Range Glucose (POC) 293 (H) 65 - 100 mg/dL GLUCOSE, POC  Collection Time: 19  2:49 PM  
 Result Value Ref Range Glucose (POC) 156 (H) 65 - 100 mg/dL MAGNESIUM Collection Time: 19  2:53 PM  
Result Value Ref Range Magnesium 5.6 (HH) 1.8 - 2.4 mg/dL GLUCOSE, POC Collection Time: 19  8:22 PM  
Result Value Ref Range Glucose (POC) 97 65 - 100 mg/dL MAGNESIUM Collection Time: 19  8:40 PM  
Result Value Ref Range Magnesium 5.8 (HH) 1.8 - 2.4 mg/dL GLUCOSE, POC Collection Time: 19 11:08 PM  
Result Value Ref Range Glucose (POC) 128 (H) 65 - 100 mg/dL MAGNESIUM Collection Time: 19  2:40 AM  
Result Value Ref Range Magnesium 6.0 (HH) 1.8 - 2.4 mg/dL GLUCOSE, POC Collection Time: 19  6:54 AM  
Result Value Ref Range Glucose (POC) 113 (H) 65 - 100 mg/dL MAGNESIUM Collection Time: 19  8:44 AM  
Result Value Ref Range Magnesium 6.1 (HH) 1.8 - 2.4 mg/dL GLUCOSE, POC Collection Time: 19  4:36 PM  
Result Value Ref Range Glucose (POC) 83 65 - 100 mg/dL Assessment and Plan: Active Problems: 
   labor (2019) Pregnancy affected by fetal growth restriction (2019) May stop magnesium now that she is through the steroid window. No sx of PTL at this time, thus stop abx as well. Completed both doses of BMZ. Discussed with her that we will continue to monitor her at least through tomorrow and possibly Monday morning. If remains asymptomatic and her sugars stay normal, would anticipate DC home with follow up in the office with Dr Mirella Arias. Signed By: Jamie Lopez MD   
 2019

## 2019-02-09 NOTE — PROGRESS NOTES
18:50 Medication Given promethazine (PHENERGAN) with saline injection 25 mg -  Dose: 25 mg ; Route: IntraVENous ; Scheduled Time: 1900  Ravi Rahman RN  
18:50 Medication Given butorphanol (STADOL) injection 1 mg -  Dose: 1 mg ; Route: IntraVENous ; Scheduled Time: 1900  Ravi Rahman RN

## 2019-02-09 NOTE — PROGRESS NOTES
17:53 Medication Given NIFEdipine ER (PROCARDIA XL) tablet 30 mg -  Dose: 30 mg ; Route: Oral ; Scheduled Time: 1800  Angelica Stroud RN  
 
 
Pt offered pain medication but states \"I would like to wait a little while on that\"

## 2019-02-09 NOTE — ED PROVIDER NOTES
at 34 weeks, presents with contractions since last night, They last 1 minutes, and have been spaced evenly at about every 10\". Just since getting here and going to the restroom to void, they have slowed to every 20\" Pt thinks she lost her mucous plug last night, but denies any gush of fluid Past Medical History:  
Diagnosis Date  Allergic rhinitis  Eczema No past surgical history on file. Family History:  
Problem Relation Age of Onset  Hypertension Mother  Diabetes Father Social History Socioeconomic History  Marital status: SINGLE Spouse name: Not on file  Number of children: Not on file  Years of education: Not on file  Highest education level: Not on file Social Needs  Financial resource strain: Not on file  Food insecurity - worry: Not on file  Food insecurity - inability: Not on file  Transportation needs - medical: Not on file  Transportation needs - non-medical: Not on file Occupational History  Not on file Tobacco Use  Smoking status: Light Tobacco Smoker  Smokeless tobacco: Never Used  Tobacco comment: 18 pt was smoking 6x/daily at beg of pregnancy. now down to 2. Substance and Sexual Activity  Alcohol use: No  
  Frequency: Never  Drug use: No  
 Sexual activity: Yes  
  Partners: Male Birth control/protection: None Other Topics Concern [address] Service Not Asked  Blood Transfusions Not Asked  Caffeine Concern Not Asked  Occupational Exposure Not Asked Lionel Pacini Hazards Not Asked  Sleep Concern Not Asked  Stress Concern Not Asked  Weight Concern Not Asked  Special Diet Not Asked  Back Care Not Asked  Exercise Not Asked  Bike Helmet Not Asked  Seat Belt Not Asked  Self-Exams Not Asked Social History Narrative First name pronounced: \"Ni-yah\" (pronounced  \"Eye-yah\") ALLERGIES: Patient has no known allergies. Review of Systems Constitutional: Negative for chills and fever. HENT: Negative for rhinorrhea and sore throat. Eyes: Negative for discharge and redness. Respiratory: Negative for cough and shortness of breath. Cardiovascular: Negative for chest pain. Gastrointestinal: Positive for abdominal pain (crampy contractions). Negative for diarrhea, nausea and vomiting. Genitourinary: Positive for vaginal discharge. Negative for dysuria and vaginal bleeding. Musculoskeletal: Negative for arthralgias and back pain. Skin: Negative for rash. Neurological: Negative for dizziness, light-headedness and headaches. All other systems reviewed and are negative. Vitals:  
 02/06/19 2328 02/06/19 2359 02/07/19 1626 BP: 132/79  128/74 Pulse: (!) 122  100 Resp: 22  18 Temp: 98.6 °F (37 °C)  98 °F (36.7 °C) SpO2: 100% 100% 100% Weight: 69.4 kg (153 lb) Height: 5' 2.75\" (1.594 m) Physical Exam  
Constitutional: She is oriented to person, place, and time. She appears well-developed and well-nourished. HENT:  
Head: Normocephalic and atraumatic. Eyes: Conjunctivae are normal. Pupils are equal, round, and reactive to light. Right eye exhibits no discharge. Left eye exhibits no discharge. No scleral icterus. Neck: Normal range of motion. Neck supple. Cardiovascular: Normal rate, regular rhythm and normal heart sounds. Exam reveals no gallop. No murmur heard. Pulmonary/Chest: Effort normal and breath sounds normal. No respiratory distress. She has no wheezes. She has no rales. Abdominal: Soft. Bowel sounds are normal. There is no tenderness. There is no guarding. Genitourinary:  
Genitourinary Comments: Pt unable to relax enough to permit an accurate bimanual exam to assess cervix Musculoskeletal: Normal range of motion. She exhibits no edema. Neurological: She is alert and oriented to person, place, and time. She exhibits normal muscle tone. cni 2-12 grossly Skin: Skin is warm and dry. She is not diaphoretic. Psychiatric: She has a normal mood and affect. Her behavior is normal.  
Nursing note and vitals reviewed. MDM Number of Diagnoses or Management Options Premature uterine contractions causing threatened premature labor in third trimester:  
Diagnosis management comments: Medical decision making note: 
 labor vs ursulasteve granados, 
fht 151-160 D/w ob hospialist, will go to L&D at [facility] for monitoring, This concludes the \"medical decision making note\" part of this emergency department visit note. Procedures

## 2019-02-09 NOTE — PROGRESS NOTES
Dr. Silas Caro on phone. Update given on contractions and SVE. Orders to start Procardia 30 mg XL if contractions increase in frequency.

## 2019-02-09 NOTE — PROGRESS NOTES
11:11 Medication Stopped magnesium sulfate 20 gm/500 mL SW infusion -  Route: IntraVENous ; Line: Peripheral IV 02/07/19 Left Wrist ; Scheduled Time: 3008  Jon Ogden RN  
11:11 Medication Stopped lactated Ringers infusion -  Route: IntraVENous ; Line: Peripheral IV 02/07/19 Left Wrist ; Scheduled Time: 3834  Jon Ogden RN

## 2019-02-10 LAB
BACTERIA SPEC CULT: ABNORMAL
BACTERIA SPEC CULT: ABNORMAL
GLUCOSE BLD STRIP.AUTO-MCNC: 100 MG/DL (ref 65–100)
GLUCOSE BLD STRIP.AUTO-MCNC: 117 MG/DL (ref 65–100)
SERVICE CMNT-IMP: ABNORMAL

## 2019-02-10 PROCEDURE — 74011250637 HC RX REV CODE- 250/637: Performed by: OBSTETRICS & GYNECOLOGY

## 2019-02-10 PROCEDURE — 65270000029 HC RM PRIVATE

## 2019-02-10 PROCEDURE — 74011000250 HC RX REV CODE- 250: Performed by: OBSTETRICS & GYNECOLOGY

## 2019-02-10 PROCEDURE — 82962 GLUCOSE BLOOD TEST: CPT

## 2019-02-10 PROCEDURE — 74011250636 HC RX REV CODE- 250/636: Performed by: OBSTETRICS & GYNECOLOGY

## 2019-02-10 PROCEDURE — 74011000258 HC RX REV CODE- 258: Performed by: OBSTETRICS & GYNECOLOGY

## 2019-02-10 RX ORDER — HYDROMORPHONE HYDROCHLORIDE 2 MG/ML
2 INJECTION, SOLUTION INTRAMUSCULAR; INTRAVENOUS; SUBCUTANEOUS ONCE
Status: COMPLETED | OUTPATIENT
Start: 2019-02-10 | End: 2019-02-10

## 2019-02-10 RX ORDER — TERBUTALINE SULFATE 1 MG/ML
0.25 INJECTION SUBCUTANEOUS
Status: COMPLETED | OUTPATIENT
Start: 2019-02-10 | End: 2019-02-10

## 2019-02-10 RX ORDER — BUTORPHANOL TARTRATE 2 MG/ML
1 INJECTION INTRAMUSCULAR; INTRAVENOUS
Status: COMPLETED | OUTPATIENT
Start: 2019-02-10 | End: 2019-02-10

## 2019-02-10 RX ORDER — HYDROMORPHONE HYDROCHLORIDE 2 MG/ML
2 INJECTION, SOLUTION INTRAMUSCULAR; INTRAVENOUS; SUBCUTANEOUS
Status: DISCONTINUED | OUTPATIENT
Start: 2019-02-10 | End: 2019-02-11

## 2019-02-10 RX ADMIN — NIFEDIPINE 30 MG: 30 TABLET, FILM COATED, EXTENDED RELEASE ORAL at 21:44

## 2019-02-10 RX ADMIN — AMPICILLIN SODIUM 2 G: 2 INJECTION, POWDER, FOR SOLUTION INTRAMUSCULAR; INTRAVENOUS at 16:29

## 2019-02-10 RX ADMIN — NIFEDIPINE 30 MG: 30 TABLET, FILM COATED, EXTENDED RELEASE ORAL at 09:02

## 2019-02-10 RX ADMIN — TERBUTALINE SULFATE 0.25 MG: 1 INJECTION SUBCUTANEOUS at 16:27

## 2019-02-10 RX ADMIN — SODIUM CHLORIDE, SODIUM LACTATE, POTASSIUM CHLORIDE, AND CALCIUM CHLORIDE 75 ML/HR: 600; 310; 30; 20 INJECTION, SOLUTION INTRAVENOUS at 16:33

## 2019-02-10 RX ADMIN — BUTORPHANOL TARTRATE 1 MG: 2 INJECTION, SOLUTION INTRAMUSCULAR; INTRAVENOUS at 16:28

## 2019-02-10 RX ADMIN — TERBUTALINE SULFATE 0.25 MG: 1 INJECTION SUBCUTANEOUS at 19:53

## 2019-02-10 RX ADMIN — AMPICILLIN SODIUM 2 G: 2 INJECTION, POWDER, FOR SOLUTION INTRAMUSCULAR; INTRAVENOUS at 09:10

## 2019-02-10 RX ADMIN — AMPICILLIN SODIUM 2 G: 2 INJECTION, POWDER, FOR SOLUTION INTRAMUSCULAR; INTRAVENOUS at 21:44

## 2019-02-10 RX ADMIN — HYDROMORPHONE HYDROCHLORIDE 2 MG: 2 INJECTION, SOLUTION INTRAMUSCULAR; INTRAVENOUS; SUBCUTANEOUS at 19:53

## 2019-02-10 RX ADMIN — PROMETHAZINE HYDROCHLORIDE 25 MG: 25 INJECTION INTRAMUSCULAR; INTRAVENOUS at 16:28

## 2019-02-10 RX ADMIN — AMPICILLIN SODIUM 2 G: 2 INJECTION, POWDER, FOR SOLUTION INTRAMUSCULAR; INTRAVENOUS at 01:58

## 2019-02-10 NOTE — PROGRESS NOTES
16:27 Medication Given terbutaline (BRETHINE) injection 0.25 mg -  Dose: 0.25 mg ; Route: SubCUTAneous ;  Site: Left Arm ; Scheduled Time: 1700  Darrion Lim RN  
16:28 Medication Given butorphanol (STADOL) injection 1 mg -  Dose: 1 mg ; Route: IntraVENous ; Site: Right Arm ; Scheduled Time: 1700  Darrion Lim RN  
16:28 Medication Given promethazine (PHENERGAN) with saline injection 25 mg -  Dose: 25 mg ; Route: IntraVENous ; Scheduled Time: 1700  Darrion Lim RN  
16:29 Medication New Bag ampicillin (OMNIPEN) 2 g in 0.9% sodium chloride (MBP/ADV) 100 mL -  Dose: 2 g ; Rate: 200 mL/hr ; Route: IntraVENous ; Line: Peripheral IV 02/07/19 Left Wrist ; Scheduled Time: 1400  Darrion Lim RN

## 2019-02-10 NOTE — PROGRESS NOTES
Patient dianna every 4-5 min, not changing cervix at this time. SVE 1.5/90/-3. Will continue to monitor the patient. Per MD cai to have stadol every 3 hours prn.

## 2019-02-10 NOTE — PROGRESS NOTES
Assessment complete at this time per flow sheet. Vital signs stable. Denied contractions at this time but she is very nervous they will start back up today now that the magnesium if off. Patient denies N/V, pain, headache, vaginal bleeding, vaginal leaking of fluid. Patient states positive fetal movement. Encouraged to call as needed.

## 2019-02-10 NOTE — PROGRESS NOTES
02/10/19 1612 Cervical Exam  
Dilation (cm) 3 Eff 90 % Dr. Huan Macias on phone. Update given. Orders received.

## 2019-02-10 NOTE — PROGRESS NOTES
09:02 Medication Given NIFEdipine ER (PROCARDIA XL) tablet 30 mg -  Dose: 30 mg ; Route: Oral ; Scheduled Time: 0900  Yuliana Davalos RN Procardia given as ordered. Pt has not ate breakfast. States she is too nervous too eat. Encouraged to order something to eat. Denied N/V/D.

## 2019-02-10 NOTE — PROGRESS NOTES
High Risk Obstetrics Progress Note Name: Huong Tobin MRN: [id number]  SSN: [id number] YOB: 2000  Age: 22 y.o. Sex: female Subjective: LOS: 3 days Estimated Date of Delivery: 3/18/19 Gestational Age Today: 34w7d Patient admitted for  labor. States she is feeling good fetal movement. She had contractions and pressure overnight and was started on procardia and eventually given IM terb x 1. Objective:  
 
Vitals:  Blood pressure (!) 118/6, pulse (!) 59, temperature 97.6 °F (36.4 °C), resp. rate 18, height 5' 1\" (1.549 m), weight 155 lb (70.3 kg), last menstrual period 2018, SpO2 100 %. Temp (24hrs), Av.8 °F (36.6 °C), Min:97.6 °F (36.4 °C), Max:98.2 °F (36.8 °C) Systolic (44FVY), ZDD:697 , Min:118 , Max:135 Diastolic (92XAP), DQW:67, Min:6, Max:84 Intake and Output:    
  
 
Physical Exam: 
Patient without distress. Heart: Regular rate Lung: normal respiratory effort Abdomen: soft, nontender, gravid Fundus: soft and non tender Cervical Exam: per RN last night - no change from admission /-3 Lower Extremities:  - Edema No, Reflexes 2+ BL Membranes:  Intact Uterine Activity:  None Fetal Heart Rate:  Reactive Labs:  
Recent Results (from the past 36 hour(s)) GLUCOSE, POC Collection Time: 19  8:22 PM  
Result Value Ref Range Glucose (POC) 97 65 - 100 mg/dL MAGNESIUM Collection Time: 19  8:40 PM  
Result Value Ref Range Magnesium 5.8 (HH) 1.8 - 2.4 mg/dL GLUCOSE, POC Collection Time: 19 11:08 PM  
Result Value Ref Range Glucose (POC) 128 (H) 65 - 100 mg/dL MAGNESIUM Collection Time: 19  2:40 AM  
Result Value Ref Range Magnesium 6.0 (HH) 1.8 - 2.4 mg/dL GLUCOSE, POC Collection Time: 19  6:54 AM  
Result Value Ref Range Glucose (POC) 113 (H) 65 - 100 mg/dL MAGNESIUM Collection Time: 19  8:44 AM  
Result Value Ref Range Magnesium 6.1 (HH) 1.8 - 2.4 mg/dL GLUCOSE, POC Collection Time: 19  4:36 PM  
Result Value Ref Range Glucose (POC) 83 65 - 100 mg/dL GLUCOSE, POC Collection Time: 19 10:43 PM  
Result Value Ref Range Glucose (POC) 107 (H) 65 - 100 mg/dL Assessment and Plan: Active Problems: 
   labor (2019) Pregnancy affected by fetal growth restriction (2019) Mag was restarted since it had been dc'd at only 12 hours after her 2nd BMZ. Needs to be stopped this AM. RN aware. Continue procardia 30mgXL BID for now. BS fine yesterday, if ok today then dc checking them. Pt may be able to stop the ampicillin as well, recommend continuing it until she has gotten up this morning to see if she will start dianna again. Discussed POC with pt and she denies any further questions. Signed By: Emmanuel Angel MD   
 February 10, 2019

## 2019-02-11 ENCOUNTER — ANESTHESIA (OUTPATIENT)
Dept: LABOR AND DELIVERY | Age: 19
DRG: 560 | End: 2019-02-11
Payer: MEDICAID

## 2019-02-11 ENCOUNTER — ANESTHESIA EVENT (OUTPATIENT)
Dept: LABOR AND DELIVERY | Age: 19
DRG: 560 | End: 2019-02-11
Payer: MEDICAID

## 2019-02-11 PROBLEM — Z37.9 NORMAL LABOR: Status: ACTIVE | Noted: 2019-02-11

## 2019-02-11 PROBLEM — F17.200 SMOKER: Status: ACTIVE | Noted: 2019-02-11

## 2019-02-11 PROBLEM — O13.3 GESTATIONAL HYPERTENSION, THIRD TRIMESTER: Status: ACTIVE | Noted: 2019-02-11

## 2019-02-11 PROBLEM — O14.03 MILD PRE-ECLAMPSIA IN THIRD TRIMESTER: Status: ACTIVE | Noted: 2019-02-11

## 2019-02-11 LAB
ABO + RH BLD: NORMAL
ALBUMIN SERPL-MCNC: 2.8 G/DL (ref 3.5–5)
ALBUMIN/GLOB SERPL: 0.7 {RATIO}
ALP SERPL-CCNC: 252 U/L (ref 50–136)
ALT SERPL-CCNC: 23 U/L (ref 12–65)
ANION GAP SERPL CALC-SCNC: 9 MMOL/L
ARTERIAL PATENCY WRIST A: ABNORMAL
AST SERPL-CCNC: 27 U/L (ref 15–37)
BASE DEFICIT BLD-SCNC: 2 MMOL/L
BDY SITE: ABNORMAL
BILIRUB SERPL-MCNC: 0.3 MG/DL (ref 0.2–1.1)
BLOOD GROUP ANTIBODIES SERPL: NORMAL
BODY TEMPERATURE: 98.6
BUN SERPL-MCNC: 3 MG/DL (ref 6–23)
CALCIUM SERPL-MCNC: 8.5 MG/DL (ref 8.3–10.4)
CHLORIDE SERPL-SCNC: 104 MMOL/L (ref 98–107)
CO2 BLD-SCNC: 27 MMOL/L
CO2 SERPL-SCNC: 25 MMOL/L (ref 21–32)
COLLECT TIME,HTIME: 1628
CREAT SERPL-MCNC: 0.59 MG/DL (ref 0.6–1)
CREAT UR-MCNC: 40 MG/DL
ERYTHROCYTE [DISTWIDTH] IN BLOOD BY AUTOMATED COUNT: 14.8 % (ref 11.9–14.6)
GAS FLOW.O2 O2 DELIVERY SYS: ABNORMAL L/MIN
GLOBULIN SER CALC-MCNC: 4.3 G/DL (ref 2.3–3.5)
GLUCOSE SERPL-MCNC: 70 MG/DL (ref 65–100)
HCO3 BLDV-SCNC: 25 MMOL/L (ref 23–28)
HCT VFR BLD AUTO: 35 % (ref 35.8–46.3)
HGB BLD-MCNC: 11 G/DL (ref 11.7–15.4)
LDH SERPL L TO P-CCNC: 266 U/L (ref 100–190)
MCH RBC QN AUTO: 25.1 PG (ref 26.1–32.9)
MCHC RBC AUTO-ENTMCNC: 31.4 G/DL (ref 31.4–35)
MCV RBC AUTO: 79.7 FL (ref 79.6–97.8)
NRBC # BLD: 0 K/UL (ref 0–0.2)
PCO2 BLDCO: 51 MMHG (ref 32–68)
PH BLDCO: 7.3 [PH] (ref 7.15–7.38)
PLATELET # BLD AUTO: 124 K/UL (ref 150–450)
PMV BLD AUTO: 11.6 FL (ref 9.4–12.3)
PO2 BLDCO: 17 MMHG
POTASSIUM SERPL-SCNC: 3.4 MMOL/L (ref 3.5–5.1)
PROT SERPL-MCNC: 7.1 G/DL
PROT UR-MCNC: 17 MG/DL
PROT/CREAT UR-RTO: 0.4
RBC # BLD AUTO: 4.39 M/UL (ref 4.05–5.2)
SAO2 % BLDV: 19 % (ref 65–88)
SERVICE CMNT-IMP: ABNORMAL
SODIUM SERPL-SCNC: 138 MMOL/L (ref 136–145)
SPECIMEN EXP DATE BLD: NORMAL
SPECIMEN TYPE: ABNORMAL
URATE SERPL-MCNC: 3 MG/DL (ref 2.6–6)
WBC # BLD AUTO: 14.4 K/UL (ref 4.3–11.1)

## 2019-02-11 PROCEDURE — 74011250636 HC RX REV CODE- 250/636: Performed by: OBSTETRICS & GYNECOLOGY

## 2019-02-11 PROCEDURE — 77030014125 HC TY EPDRL BBMI -B: Performed by: NURSE ANESTHETIST, CERTIFIED REGISTERED

## 2019-02-11 PROCEDURE — 74011250636 HC RX REV CODE- 250/636

## 2019-02-11 PROCEDURE — 76060000078 HC EPIDURAL ANESTHESIA

## 2019-02-11 PROCEDURE — 84156 ASSAY OF PROTEIN URINE: CPT

## 2019-02-11 PROCEDURE — 77030034696 HC CATH URETH FOL 2W BARD -A

## 2019-02-11 PROCEDURE — 85027 COMPLETE CBC AUTOMATED: CPT

## 2019-02-11 PROCEDURE — 80053 COMPREHEN METABOLIC PANEL: CPT

## 2019-02-11 PROCEDURE — 75410000002 HC LABOR FEE PER 1 HR

## 2019-02-11 PROCEDURE — 10907ZC DRAINAGE OF AMNIOTIC FLUID, THERAPEUTIC FROM PRODUCTS OF CONCEPTION, VIA NATURAL OR ARTIFICIAL OPENING: ICD-10-PCS | Performed by: OBSTETRICS & GYNECOLOGY

## 2019-02-11 PROCEDURE — 83615 LACTATE (LD) (LDH) ENZYME: CPT

## 2019-02-11 PROCEDURE — 77030020254 HC SOL INJ D5LR LACTATED RINGER

## 2019-02-11 PROCEDURE — 84550 ASSAY OF BLOOD/URIC ACID: CPT

## 2019-02-11 PROCEDURE — 77030011943

## 2019-02-11 PROCEDURE — A4300 CATH IMPL VASC ACCESS PORTAL: HCPCS | Performed by: NURSE ANESTHETIST, CERTIFIED REGISTERED

## 2019-02-11 PROCEDURE — 86900 BLOOD TYPING SEROLOGIC ABO: CPT

## 2019-02-11 PROCEDURE — 4A1HXCZ MONITORING OF PRODUCTS OF CONCEPTION, CARDIAC RATE, EXTERNAL APPROACH: ICD-10-PCS | Performed by: OBSTETRICS & GYNECOLOGY

## 2019-02-11 PROCEDURE — 74011250637 HC RX REV CODE- 250/637: Performed by: OBSTETRICS & GYNECOLOGY

## 2019-02-11 PROCEDURE — 75410000000 HC DELIVERY VAGINAL/SINGLE

## 2019-02-11 PROCEDURE — 77030018846 HC SOL IRR STRL H20 ICUM -A

## 2019-02-11 PROCEDURE — 77030020255 HC SOL INJ LR 1000ML BG

## 2019-02-11 PROCEDURE — 74011000258 HC RX REV CODE- 258: Performed by: OBSTETRICS & GYNECOLOGY

## 2019-02-11 PROCEDURE — 82803 BLOOD GASES ANY COMBINATION: CPT

## 2019-02-11 PROCEDURE — 75410000003 HC RECOV DEL/VAG/CSECN EA 0.5 HR

## 2019-02-11 PROCEDURE — 74011000250 HC RX REV CODE- 250

## 2019-02-11 PROCEDURE — 65270000029 HC RM PRIVATE

## 2019-02-11 PROCEDURE — 36415 COLL VENOUS BLD VENIPUNCTURE: CPT

## 2019-02-11 PROCEDURE — 77030011945 HC CATH URIN INT ST MENT -A

## 2019-02-11 RX ORDER — LIDOCAINE HYDROCHLORIDE 10 MG/ML
1 INJECTION INFILTRATION; PERINEURAL
Status: DISCONTINUED | OUTPATIENT
Start: 2019-02-11 | End: 2019-02-11

## 2019-02-11 RX ORDER — OXYTOCIN/RINGER'S LACTATE 15/250 ML
0-25 PLASTIC BAG, INJECTION (ML) INTRAVENOUS ONCE
Status: DISCONTINUED | OUTPATIENT
Start: 2019-02-11 | End: 2019-02-11

## 2019-02-11 RX ORDER — SODIUM CHLORIDE 0.9 % (FLUSH) 0.9 %
5-40 SYRINGE (ML) INJECTION AS NEEDED
Status: DISCONTINUED | OUTPATIENT
Start: 2019-02-11 | End: 2019-02-11

## 2019-02-11 RX ORDER — DEXTROSE, SODIUM CHLORIDE, SODIUM LACTATE, POTASSIUM CHLORIDE, AND CALCIUM CHLORIDE 5; .6; .31; .03; .02 G/100ML; G/100ML; G/100ML; G/100ML; G/100ML
125 INJECTION, SOLUTION INTRAVENOUS CONTINUOUS
Status: DISCONTINUED | OUTPATIENT
Start: 2019-02-11 | End: 2019-02-11

## 2019-02-11 RX ORDER — NALOXONE HYDROCHLORIDE 0.4 MG/ML
0.4 INJECTION, SOLUTION INTRAMUSCULAR; INTRAVENOUS; SUBCUTANEOUS AS NEEDED
Status: DISCONTINUED | OUTPATIENT
Start: 2019-02-11 | End: 2019-02-12 | Stop reason: SDUPTHER

## 2019-02-11 RX ORDER — ROPIVACAINE HYDROCHLORIDE 2 MG/ML
INJECTION, SOLUTION EPIDURAL; INFILTRATION; PERINEURAL AS NEEDED
Status: DISCONTINUED | OUTPATIENT
Start: 2019-02-11 | End: 2019-02-11 | Stop reason: HOSPADM

## 2019-02-11 RX ORDER — OXYTOCIN 10 [USP'U]/ML
20 INJECTION, SOLUTION INTRAMUSCULAR; INTRAVENOUS ONCE
Status: COMPLETED | OUTPATIENT
Start: 2019-02-11 | End: 2019-02-11

## 2019-02-11 RX ORDER — OXYCODONE AND ACETAMINOPHEN 5; 325 MG/1; MG/1
1 TABLET ORAL
Status: DISCONTINUED | OUTPATIENT
Start: 2019-02-11 | End: 2019-02-12 | Stop reason: SDUPTHER

## 2019-02-11 RX ORDER — OXYTOCIN 10 [USP'U]/ML
INJECTION, SOLUTION INTRAMUSCULAR; INTRAVENOUS
Status: COMPLETED
Start: 2019-02-11 | End: 2019-02-11

## 2019-02-11 RX ORDER — ROPIVACAINE HYDROCHLORIDE 5 MG/ML
INJECTION, SOLUTION EPIDURAL; INFILTRATION; PERINEURAL AS NEEDED
Status: DISCONTINUED | OUTPATIENT
Start: 2019-02-11 | End: 2019-02-11 | Stop reason: HOSPADM

## 2019-02-11 RX ORDER — DIPHENHYDRAMINE HCL 25 MG
25 CAPSULE ORAL
Status: DISCONTINUED | OUTPATIENT
Start: 2019-02-11 | End: 2019-02-12 | Stop reason: SDUPTHER

## 2019-02-11 RX ORDER — OXYCODONE AND ACETAMINOPHEN 7.5; 325 MG/1; MG/1
2 TABLET ORAL
Status: DISCONTINUED | OUTPATIENT
Start: 2019-02-11 | End: 2019-02-12 | Stop reason: SDUPTHER

## 2019-02-11 RX ORDER — SODIUM CHLORIDE 0.9 % (FLUSH) 0.9 %
5-40 SYRINGE (ML) INJECTION EVERY 8 HOURS
Status: DISCONTINUED | OUTPATIENT
Start: 2019-02-11 | End: 2019-02-11

## 2019-02-11 RX ORDER — LIDOCAINE HYDROCHLORIDE 20 MG/ML
JELLY TOPICAL
Status: DISCONTINUED | OUTPATIENT
Start: 2019-02-11 | End: 2019-02-11

## 2019-02-11 RX ORDER — BISACODYL 5 MG
5 TABLET, DELAYED RELEASE (ENTERIC COATED) ORAL DAILY PRN
Status: DISCONTINUED | OUTPATIENT
Start: 2019-02-11 | End: 2019-02-13 | Stop reason: HOSPADM

## 2019-02-11 RX ORDER — FENTANYL CITRATE 50 UG/ML
INJECTION, SOLUTION INTRAMUSCULAR; INTRAVENOUS AS NEEDED
Status: DISCONTINUED | OUTPATIENT
Start: 2019-02-11 | End: 2019-02-11 | Stop reason: HOSPADM

## 2019-02-11 RX ORDER — IBUPROFEN 800 MG/1
800 TABLET ORAL EVERY 8 HOURS
Status: DISCONTINUED | OUTPATIENT
Start: 2019-02-11 | End: 2019-02-12

## 2019-02-11 RX ORDER — LIDOCAINE HYDROCHLORIDE AND EPINEPHRINE 15; 5 MG/ML; UG/ML
INJECTION, SOLUTION EPIDURAL AS NEEDED
Status: DISCONTINUED | OUTPATIENT
Start: 2019-02-11 | End: 2019-02-11 | Stop reason: HOSPADM

## 2019-02-11 RX ORDER — MINERAL OIL
120 OIL (ML) ORAL
Status: COMPLETED | OUTPATIENT
Start: 2019-02-11 | End: 2019-02-11

## 2019-02-11 RX ORDER — BUTORPHANOL TARTRATE 2 MG/ML
1 INJECTION INTRAMUSCULAR; INTRAVENOUS
Status: DISCONTINUED | OUTPATIENT
Start: 2019-02-11 | End: 2019-02-11

## 2019-02-11 RX ORDER — ONDANSETRON 2 MG/ML
8 INJECTION INTRAMUSCULAR; INTRAVENOUS
Status: DISCONTINUED | OUTPATIENT
Start: 2019-02-11 | End: 2019-02-12 | Stop reason: SDUPTHER

## 2019-02-11 RX ORDER — ROPIVACAINE HYDROCHLORIDE 2 MG/ML
INJECTION, SOLUTION EPIDURAL; INFILTRATION; PERINEURAL
Status: DISCONTINUED | OUTPATIENT
Start: 2019-02-11 | End: 2019-02-11 | Stop reason: HOSPADM

## 2019-02-11 RX ADMIN — OXYTOCIN 20 UNITS: 10 INJECTION, SOLUTION INTRAMUSCULAR; INTRAVENOUS at 16:44

## 2019-02-11 RX ADMIN — MINERAL OIL 120 ML: 471.95 OIL ORAL at 16:42

## 2019-02-11 RX ADMIN — AMPICILLIN SODIUM 2 G: 2 INJECTION, POWDER, FOR SOLUTION INTRAMUSCULAR; INTRAVENOUS at 04:47

## 2019-02-11 RX ADMIN — SODIUM CHLORIDE, SODIUM LACTATE, POTASSIUM CHLORIDE, CALCIUM CHLORIDE, AND DEXTROSE MONOHYDRATE 125 ML/HR: 600; 310; 30; 20; 5 INJECTION, SOLUTION INTRAVENOUS at 11:38

## 2019-02-11 RX ADMIN — AMPICILLIN SODIUM 2 G: 2 INJECTION, POWDER, FOR SOLUTION INTRAMUSCULAR; INTRAVENOUS at 09:42

## 2019-02-11 RX ADMIN — OXYTOCIN 20 UNITS: 10 INJECTION INTRAVENOUS at 16:44

## 2019-02-11 RX ADMIN — IBUPROFEN 800 MG: 800 TABLET, FILM COATED ORAL at 21:53

## 2019-02-11 RX ADMIN — ROPIVACAINE HYDROCHLORIDE 6 ML: 5 INJECTION, SOLUTION EPIDURAL; INFILTRATION; PERINEURAL at 16:06

## 2019-02-11 RX ADMIN — ROPIVACAINE HYDROCHLORIDE 8 ML/HR: 2 INJECTION, SOLUTION EPIDURAL; INFILTRATION; PERINEURAL at 11:21

## 2019-02-11 RX ADMIN — HYDROMORPHONE HYDROCHLORIDE 2 MG: 2 INJECTION, SOLUTION INTRAMUSCULAR; INTRAVENOUS; SUBCUTANEOUS at 01:55

## 2019-02-11 RX ADMIN — LIDOCAINE HYDROCHLORIDE AND EPINEPHRINE 5 ML: 15; 5 INJECTION, SOLUTION EPIDURAL at 11:17

## 2019-02-11 RX ADMIN — FENTANYL CITRATE 100 MCG: 50 INJECTION, SOLUTION INTRAMUSCULAR; INTRAVENOUS at 16:06

## 2019-02-11 RX ADMIN — BUTORPHANOL TARTRATE 1 MG: 2 INJECTION, SOLUTION INTRAMUSCULAR; INTRAVENOUS at 09:53

## 2019-02-11 RX ADMIN — AMPICILLIN SODIUM 2 G: 2 INJECTION, POWDER, FOR SOLUTION INTRAMUSCULAR; INTRAVENOUS at 14:17

## 2019-02-11 RX ADMIN — ROPIVACAINE HYDROCHLORIDE 8 ML: 2 INJECTION, SOLUTION EPIDURAL; INFILTRATION; PERINEURAL at 11:21

## 2019-02-11 NOTE — PROGRESS NOTES
Labor Progress Note Continue Labor Patient seen, fetal heart rate and contraction pattern evaluated, patient examined. Pt much more comfortable s/p TIFFANY. Pt progressed to 5/90/-1 on her own with increasing bps. HELLP labs wnl other than  and plts slightly low at 124, P:C 0.4--c/w Pre Eclampsia withOUT severe features. Did have 1 severe bp during a contraction prior to TIFFANY. Will watch bps closely and Mag PRN. Patient Vitals for the past 8 hrs: 
 BP Temp Pulse Resp SpO2  
19 1000 154/74  71    
19 0953 (!) 161/99  (!) 107    
19 0947 (!) 155/94  (!) 125    
19 0813 140/82 98.1 °F (36.7 °C) 67 18 100 % [place]:  Q2-3min SVE:  6/C/-1 Assessment/Plan: 
22 y. o. at 35w0d in AL with PreEclampsia withOUT Severe Features, IUGR: 
 
1) AL:  Arom clear this check 2) FHTs:  Cat I 
3) GBS +:  Continued on Ampicillin 4) Mild PreE:  Watching closely. Will Mag if develops severe features.   
 
 
Missy Blake MD

## 2019-02-11 NOTE — PROGRESS NOTES
SBAR OUT Report: Mother Verbal report given to GUILLERMO Carver RN on this patient, who is now being transferred to MIU for routine progression of care. The patient is not wearing a green \"Anesthesia-Duramorph\" band. Report consisted of patient's Situation, Background, Assessment and Recommendations (SBAR). Shannon ID bands were compared with the identification form, and verified with the patient and receiving nurse. Information from the SBAR and the [address] [facility] Report was reviewed with the receiving nurse; opportunity for questions and clarification provided.

## 2019-02-11 NOTE — PROGRESS NOTES
1110 Anesthesia in room. Pt to side of bed for epidural placement. 1116 Epidural cath in place. MD johnsonis. Test dose. Serial BP as documented. 1122 Pt to right hip tilt.

## 2019-02-11 NOTE — PROGRESS NOTES
To hold all meds and fluids for now. Monitor pt BP during the day. DC from monitor and allow pt to be up in room. Pt informed of labs and need to collect and send urine. Instructed to call out with any changes.

## 2019-02-11 NOTE — PROGRESS NOTES
Recovery completed. Pt up to void without difficulty. Cora care taught. Transferred to MIU room 459.

## 2019-02-11 NOTE — PROGRESS NOTES
Pt in bed, states her pain is starting to get a little stronger. Encouraged pt to call when desires pain medication.

## 2019-02-11 NOTE — PROGRESS NOTES
1610 SVE C/C/+1. MD at bedside. Pt not able to refrain from pushing. [address] up for delivery. Extra staff called for 35 week delivery. 64  female, Apgars 8&9. Infant skin to skin. 1632 Placenta expressed. Pitocin infusing. IV infiltrated. IM pitocin given. Recovery in progress.

## 2019-02-11 NOTE — PROGRESS NOTES
AntePartum High Risk Pregnancy Note "[employer]s 
35w0d Hospital Day: 5 Patient is a 18yo G1 admitted for PTL, now at 35w0d Estimated Date of Delivery: 3/18/19 and newly diagnosed Asymmetric IUGR. Has made very slow progress from time of admission on 19 from /-3 -->/-2 today. She is s/p BMZ x2 -->now mature. Has been getting Procardia 30 XL Q12. Ctxs present but very irregular and pt sleeping through them mostly. Pt noted to have 6 mild range pressures now since admission. No hx CHTN, denies HA, SOB/CP, RUQ pain, vision changes. At least qualifies for [facility] at this point. Will get HELLP labs and P:C ratio this am and DC Procardia and re-evaluate. 19 M bedside US:  Asymmetric IUGR; AC 3.8%, ANJEL 19, BPP 6/8, dopplers wnl Vitals:   
Patient Vitals for the past 24 hrs: 
 BP Temp Pulse Resp SpO2  
19 0813 140/82 98.1 °F (36.7 °C) 67 18 100 % 02/10/19 2144 136/61  80    
02/10/19 1953 152/75  68    
02/10/19 1918 145/80  70    
02/10/19 1834   80    
02/10/19 1818 149/72  68    
02/10/19 1809 (!) 156/101  86    
02/10/19 1634 143/75 98.8 °F (37.1 °C) 74 18  Temp (24hrs), Av.5 °F (36.9 °C), Min:98.1 °F (36.7 °C), Max:98.8 °F (37.1 °C) No intake/output data recorded.  1901 -  0700 In: 1000 [I.V.:1000] Out: - Physical Exam: 
Constitutional: She appears well-developed and well-nourished. No distress. Sleeping in bed on arrival 
HENT:   
Head: Normocephalic and atraumatic. Lungs: CTAB, effort normal, no rales/crackles Cardiovascular: RRR, no M/R/G Abd:  Gravid, no RUQ TTP Skin: She is not diaphoretic. Psychiatric: She has a normal mood and affect. Her behavior is normal. Thought content normal.  
Exts:  DTRs 2+, no c/c/e, no clonus SVE:  /-2 FHTs: Cat I, R [place]: irreg Labs:  
Recent Results (from the past 24 hour(s)) GLUCOSE, POC  Collection Time: 02/10/19 12:18 PM  
 Result Value Ref Range Glucose (POC) 100 65 - 100 mg/dL Assessment and Plan:   
18yo G1 at 35w0d with PTL, IUGR, and GHTN: 
 
1) PTL:  
 -DC Procardia 
 -will allow to walk around room/lima and see if progresses on her own 
 -continue Amp for now 
 -steroid mature 2) GHTN: 
 -pt asx -HELLP labs/P:C pending 
 -DC Procardia and will watch bps closely 3) IUGR: 
 -smoker, MJ use Patient Active Problem List  
 Diagnosis Date Noted  Gestational hypertension, third trimester 2019  Pregnancy affected by fetal growth restriction 2019   labor 2019  Marijuana use 2019  Primigravida in third trimester 2019  Allergic rhinitis  Eczema Denis Breaux MD

## 2019-02-11 NOTE — PROGRESS NOTES
Call to MD. Informed of pt assessment and request for pain medication. Md will place orders and come assess pt this morning.

## 2019-02-11 NOTE — PROGRESS NOTES
Pt transferred to [facility] for labor. Consents signed. Plan for labor reviewed. Mother plans to breast feed. Discussed LPI protocol.

## 2019-02-11 NOTE — L&D DELIVERY NOTE
Delivery Summary    Patient: Chloe Maria MRN: [id number]  SSN: [id number]    YOB: 2000  Age: 22 y.o. Sex: female       Information for the patient's :  Rashad Groves [[id number]]       Labor Events:    Labor: Yes   Rupture Date: 2019   Rupture Time: 11:40 AM   Rupture Type AROM   Amniotic Fluid Volume: Moderate    Amniotic Fluid Description: Clear None   Induction: None       Augmentation: AROM   Labor Events: None     Cervical Ripening:     None     Delivery Events:  Episiotomy: None   Laceration(s): None     Repaired: None    Number of Repair Packets:     Suture Type and Size: None     Estimated Blood Loss (ml): 300ml       Delivery Date: 2019    Delivery Time: 4:28 PM  Delivery Type: Vaginal, Spontaneous  Sex:  Male     Gestational Age: 35w0d   Delivery Clinician:  Gilbert Mirza  Living Status: Living   Delivery [facility]          APGARS  One minute Five minutes Ten minutes   Skin color: 1   1        Heart rate: 2   2        Grimace: 2   2        Muscle tone: 1   2        Breathin   2        Totals: 8   9            Presentation: Vertex    Position: Left Occiput Posterior  Resuscitation Method:  Suctioning-bulb; Tactile Stimulation     Meconium Stained: None      Cord Information: 3 Vessels  Complications: Nuchal Cord With Compressions  Cord around: head  Delayed cord clamping? Yes  Cord clamped date/time:2019  4:29 PM  Disposition of Cord Blood: Lab    Blood Gases Sent?: Yes    Placenta:  Date/Time: 2019  4:31 PM  Removal: Spontaneous      Appearance: Normal     [place] Measurements:  Birth Weight: 4 lb 8.8 oz (2.065 kg)      Birth Length: 46.5 cm      Head Circumference: 30.5 cm      Chest Circumference: 26 cm     Abdominal Girth:       Other Providers:   LOTTIE SOSA;DINAH ANTHONY;OXANA SEGAL;CHECO WEINSTEIN;TEGAN BIGGS;LUZ MELVIN;JOÃO LATIF;JANE CHRISTENSEN;ALICIA FELICIANO, Obstetrician;Primary Nurse;Primary  Nurse;Neonatologist;Anesthesiologist;Crna; Charge Nurse;Scrub Tech;Respiratory Therapist           Group B Strep: No results found for: GRBSEXT, GRBSEXT  Information for the patient's :  Kristen Jimenez [[id number]]   No results found for: ABORH, PCTABR, PCTDIG, BILI, ABORHEXT, ABORH    No results for input(s): PCO2CB, PO2CB, HCO3I, SO2I, IBD, PTEMPI, SPECTI, PHICB, ISITE, IDEV, IALLEN in the last 72 hours.  of a VMI at 1628 on 19 Apgars 8, 9    NICU called to be present at time of delivery given 35w0d, hx MJ use. C/C/+2-->pushed to deliver head LOP onto intact perineum. Body followed easily thereafter. Delayed cord clamping while baby on mom. Cord clamped/cut. Baby to NICU team.  Cord gases were drawn. Placenta delivered S/I/3VC. Bladder emptied--400mL. No lacs noted. FF w/ pit and massage. EBL 300mL. Mom/baby stable.        Will keep close watch of bps pp      Lidia Zamarripa MD

## 2019-02-11 NOTE — PROGRESS NOTES
Pt called out stating felt the urge to push. SVE, cervix all the way around fetal head. Pt placed on right side on birthing ball peanut.

## 2019-02-12 LAB
ARTERIAL PATENCY WRIST A: ABNORMAL
BASE DEFICIT BLD-SCNC: 4 MMOL/L
BDY SITE: ABNORMAL
BODY TEMPERATURE: 98.6
CO2 BLD-SCNC: 29 MMOL/L
COLLECT TIME,HTIME: 1628
GAS FLOW.O2 O2 DELIVERY SYS: ABNORMAL L/MIN
HCO3 BLD-SCNC: 26.8 MMOL/L (ref 22–26)
PCO2 BLDCO: 69 MMHG (ref 32–68)
PH BLDCO: 7.2 [PH] (ref 7.15–7.38)
PO2 BLDCO: 11 MMHG
SAO2 % BLD: 7 % (ref 95–98)
SERVICE CMNT-IMP: ABNORMAL
SPECIMEN TYPE: ABNORMAL

## 2019-02-12 PROCEDURE — 74011250637 HC RX REV CODE- 250/637: Performed by: OBSTETRICS & GYNECOLOGY

## 2019-02-12 PROCEDURE — 65270000029 HC RM PRIVATE

## 2019-02-12 RX ORDER — METHYLERGONOVINE MALEATE 0.2 MG/ML
0.2 INJECTION INTRAVENOUS
Status: DISCONTINUED | OUTPATIENT
Start: 2019-02-12 | End: 2019-02-12

## 2019-02-12 RX ORDER — OXYCODONE AND ACETAMINOPHEN 5; 325 MG/1; MG/1
1 TABLET ORAL
Status: DISCONTINUED | OUTPATIENT
Start: 2019-02-12 | End: 2019-02-13 | Stop reason: HOSPADM

## 2019-02-12 RX ORDER — OXYCODONE AND ACETAMINOPHEN 5; 325 MG/1; MG/1
2 TABLET ORAL
Status: DISCONTINUED | OUTPATIENT
Start: 2019-02-12 | End: 2019-02-13 | Stop reason: HOSPADM

## 2019-02-12 RX ORDER — OXYTOCIN/RINGER'S LACTATE 15/250 ML
250 PLASTIC BAG, INJECTION (ML) INTRAVENOUS ONCE
Status: DISCONTINUED | OUTPATIENT
Start: 2019-02-12 | End: 2019-02-12

## 2019-02-12 RX ORDER — POTASSIUM CHLORIDE 20 MEQ/1
20 TABLET, EXTENDED RELEASE ORAL DAILY
Status: DISCONTINUED | OUTPATIENT
Start: 2019-02-13 | End: 2019-02-13 | Stop reason: HOSPADM

## 2019-02-12 RX ORDER — DOCUSATE SODIUM 100 MG/1
100 CAPSULE, LIQUID FILLED ORAL 2 TIMES DAILY
Status: DISCONTINUED | OUTPATIENT
Start: 2019-02-12 | End: 2019-02-13 | Stop reason: HOSPADM

## 2019-02-12 RX ORDER — DIPHENHYDRAMINE HCL 25 MG
25 CAPSULE ORAL
Status: DISCONTINUED | OUTPATIENT
Start: 2019-02-12 | End: 2019-02-12

## 2019-02-12 RX ORDER — SIMETHICONE 80 MG
80 TABLET,CHEWABLE ORAL
Status: DISCONTINUED | OUTPATIENT
Start: 2019-02-12 | End: 2019-02-13 | Stop reason: HOSPADM

## 2019-02-12 RX ORDER — NALOXONE HYDROCHLORIDE 0.4 MG/ML
0.4 INJECTION, SOLUTION INTRAMUSCULAR; INTRAVENOUS; SUBCUTANEOUS
Status: ACTIVE | OUTPATIENT
Start: 2019-02-12 | End: 2019-02-13

## 2019-02-12 RX ORDER — ONDANSETRON 8 MG/1
8 TABLET, ORALLY DISINTEGRATING ORAL
Status: DISCONTINUED | OUTPATIENT
Start: 2019-02-12 | End: 2019-02-13 | Stop reason: HOSPADM

## 2019-02-12 RX ADMIN — IBUPROFEN 800 MG: 800 TABLET, FILM COATED ORAL at 09:12

## 2019-02-12 RX ADMIN — DOCUSATE SODIUM 100 MG: 100 CAPSULE, LIQUID FILLED ORAL at 18:06

## 2019-02-12 RX ADMIN — OXYCODONE AND ACETAMINOPHEN 1 TABLET: 5; 325 TABLET ORAL at 18:05

## 2019-02-12 NOTE — PROGRESS NOTES
Chart reviewed. Patient with + UDS for THC on 19 and 19. Umbilical drug screen pending.  met privately with patient. Baby's name is Mitali Sutton. Patient currently lives with her mother Dimitris Stewart, [phone]). Patient reports that her mother is supportive and involved. Patient states that she was a [place] smoker\" prior to learning of pregnancy at 3 months. Additionally, she states that she quit using marijuana, but was \"around it. \"  Patient was informed that DSS report will be made today due to [facility] use and positive urine drug screens. Patient became tearful and verbalized concern that Zafar Damon will take my baby. \"  Emotional support offered.  encouraged abstinence from marijuana and refraining from being in an environment where marijuana is used. Patient states that she has a car seat, crib, and all needed items to care for [address] [place] [address]. Patient plans to breastfeed, and she is currently enrolled in [facility]. Patient denies any history of depression/anxiety. [id number] [facility] [address]  
[phone] No PCP - list of PCPs provided.  provided informational packet on  mood disorder education/resources. Family receptive to receiving information and denied any additional needs from . Family has this 's contact information should any needs/questions arise. 1200:  Phone call to [address] ([phone]). Report provided to Felicia Tomasa due to [facility] use during pregnancy. 1700:  [facility] w/[facility] [address] ([phone], [phone]) at hospital to visit patient. Copies of patient's UDS provided. Copy of DSS Safety Plan placed on patient/ charts. Garth Caceres, Elye [address]

## 2019-02-12 NOTE — LACTATION NOTE
In to see mom for the first time. Mom was attempting to feed infant with curve tip syringe. Assisted mom with feeding inafnt and talked her through how to feed infant. Mom stated that she does not have a personal breast pump at home. Discussed with her the options to get a breast pump rental at discharge. Mom to call at next feeding for assistance.

## 2019-02-12 NOTE — PROGRESS NOTES
Post-Partum Day Number 1 Progress Note Patient doing well post-partum day #1 without significant complaint. Voiding without difficulty, normal lochia. Denies:  HA, visual changes, RUQ pain. Patient Vitals for the past 24 hrs: 
 Temp Pulse Resp BP SpO2  
02/12/19 1030 98.1 °F (36.7 °C) 71 18 125/82 98 % 02/12/19 0725 97.2 °F (36.2 °C) 70 18 131/84 98 % 02/12/19 0430 97.9 °F (36.6 °C) 80 18 120/70 98 % 02/12/19 0130 98.4 °F (36.9 °C) 67 16 119/66   
02/11/19 2230 99.2 °F (37.3 °C) 97 17 141/71   
02/11/19 2130 99 °F (37.2 °C) 83 17 146/85 98 % 02/11/19 2030 98.6 °F (37 °C) (!) 102 18 136/83 98 % 02/11/19 1930 97.9 °F (36.6 °C) 87 18 138/80 99 % 02/11/19 1818 98 °F (36.7 °C) 76 18 138/75   
02/11/19 1802  (!) 102  144/70   
02/11/19 1732  96  155/71   
02/11/19 1717  (!) 103  159/84   
02/11/19 1703  96  140/73   
02/11/19 1647  (!) 112  148/83   
02/11/19 1633 99.9 °F (37.7 °C) (!) 153 18 (!) 192/101   
02/11/19 1618  85  (!) 176/120   
02/11/19 1604  (!) 129  (!) 150/92   
02/11/19 1550  (!) 118  176/88   
02/11/19 1533  (!) 104  153/74   
02/11/19 1519  97  149/70   
02/11/19 1504  92  151/72   
02/11/19 1449 99.6 °F (37.6 °C) 88 18 146/80   
02/11/19 1433  83  140/78   
02/11/19 1419  83  154/61   
02/11/19 1417  92  143/77   
02/11/19 1403  78  145/79   
02/11/19 1348  80  140/79   
02/11/19 1333  91  146/69   
02/11/19 1318  80  142/90   
02/11/19 1304 98 °F (36.7 °C) 76 18 149/72   
02/11/19 1248  84  146/82  Exam:  Patient without distress. Abdomen soft, fundus firm at level of umbilicus, nontender Perineum with normal lochia noted. Lower extremities are negative for swelling, cords or tenderness. Recent Labs  
  02/11/19 
0907 02/07/19 2054 12/26/18 10/08/18 WBC 14.4* 12.1*  --   --   
HGB 11.0* 11.9  --   --   
HCT 35.0* 37.0  --   --   
 * 146*  --   --   
HGBEXT  --   --  11.1   12/13/18 11.0 HCTEXT  --   --  34.0   12/13/18 33.6 PLTEXT  --   --  34.0   12/13/18 219 Recent Labs  
  02/11/19 
6229 02/09/19 
3484 02/09/19 
0240 02/08/19 
2040 02/08/19 
1453 02/08/19 
0897 02/08/19 
0239 02/07/19 
2323 02/07/19 
2131   --   --   --   --   --   --  137 132* K 3.4*  --   --   --   --   --   --  3.3* 3.5   --   --   --   --   --   --  106 98 CO2 25  --   --   --   --   --   --  20* 21 AGAP 9  --   --   --   --   --   --  11 13 GLU 70  --   --   --   --   --   --  155* 380* BUN 3*  --   --   --   --   --   --  6 7 CREA 0.59*  --   --   --   --   --   --  0.83 0.66 GFRAA >60  --   --   --   --   --   --  >60 >60 GFRNA >60  --   --   --   --   --   --  >60 >60  
CA 8.5  --   --   --   --   --   --  8.1* 8.8 MG  --  6.1* 6.0* 5.8* 5.6* 5.7* 5.2*  --   --   
ALB 2.8*  --   --   --   --   --   --  2.7* 2.4* TP 7.1  --   --   --   --   --   --  6.7 5.7 GLOB 4.3*  --   --   --   --   --   --  4.0* 3.3 AGRAT 0.7  --   --   --   --   --   --  0.7 0.7 SGOT 27  --   --   --   --   --   --  20 19 ALT 23  --   --   --   --   --   --  16 12 Recent Labs  
  02/11/19 
7848 URICA 3.0 * Recent Labs  
  02/11/19 
8594 02/10/19 
1218 02/10/19 
0703 02/09/19 
2243 02/09/19 
1636 02/09/19 
0654 02/08/19 
2308 02/08/19 
2022 02/08/19 
1449 02/08/19 
1046 02/08/19 
5435 02/07/19 
2323 02/07/19 
2233 02/07/19 
2131 GLU 70  --   --   --   --   --   --   --   --   --   --  155*  --  380* GLUCPOC  --  100 117* 107* 83 113* 128* 97 156* 293* 138*  --  193*  --   
 
 
Prenatal Labs:   
Lab Results Component Value Date/Time  
 Rubella, External Immune 10/08/2018 HBsAg, External Negative 10/08/2018 HIV, External Negative 10/08/2018 RPR, External Negative 10/08/2018 Gonorrhea, External Negative 11/16/2018 Chlamydia, External Negative 11/16/2018 Problem List  Date Reviewed: 2019 Codes Class Noted Mild pre-eclampsia in third trimester ICD-10-CM: O14.03 
ICD-9-CM: 642.43  2019 Overview Addendum 2019 11:48 AM by Kelly Lassiter MD  
  New mild bps on admission Pt progressed to 5/90/-1 on her own with increasing bps. HELLP labs wnl other than  and plts slightly low at 124, P:C 0.4--c/w Pre Eclampsia withOUT severe features. Did have 1 severe bp during a contraction prior to TIFFANY. Will watch bps closely and Mag PRN. Normal labor ICD-10-CM: O80, Z37.9 ICD-9-CM: 742  2019 Smoker ICD-10-CM: U52.125 ICD-9-CM: 305.1  2019 Pregnancy affected by fetal growth restriction ICD-10-CM: C19.0219 ICD-9-CM: 656.50  2019 Overview Signed 2019  9:24 AM by Kelly Lassiter MD  
  19 [facility] bedside US:  Asymmetric IUGR; AC 3.8%, ANJEL 19, BPP 6/8, dopplers wnl * (Principal)  labor ICD-10-CM: O60.00 ICD-9-CM: 644.00  2019 Marijuana use ICD-10-CM: F12.90 ICD-9-CM: 305.20  2019 Overview Addendum 2019  8:30 AM by Kelly Lassiter MD  
  35 w:   UDS is + for marijuana. She will have f/u UDSs.  suggests she attend:   \"[employer]:   [email or web address]   [phone]  [facility]     
Comprehensive parenting program for pts 13-25 yo:  Classes, mentoring and support. Connecting young parents to local resources, learns agout:  Pregnancy, parenting and life skills, earns diapers, wipes, baby equipment, houshold supplies Or: 
Healthy Families: A service for family's who are pregnant or have  babies Professionally trained home visitors provide information so that you as parents can provide the best for your new baby. This program provides: 
1. Weekly visits with your  in your own home 2. Support through your pregnancy and  the early years of your baby's life 3. Ways to make your home safe for your baby 4.  Information on how to care for your baby 5. Facts that will let you know your baby is growing and developing in healthy ways 6. Activities that you and your baby can enjoy together 7. Access to other community services UDS 34wks +THC Primigravida in third trimester ICD-10-CM: Z34.03 
ICD-9-CM: V22.0  2019 Overview Addendum 2019  7:46 PM by Ajith Alexander MD  
  Transfer @ 33 wk from [facility]. Reviewed MR:  Declined flu vaccine. Late PNC, initial prenatal visit 18 wk. Smokes 2-3 cig/d. Glucola (18) 102.  26 wk US  EFW 2 lb 3 oz, 53%. Smells like marijuana, check UDS. Boy Declined flu vacc (see Samira's note) Allergic rhinitis ICD-10-CM: J30.9 ICD-9-CM: 477.9  Unknown Eczema ICD-10-CM: L30.9 ICD-9-CM: 692.9  Unknown Current Facility-Administered Medications Medication Dose Route Frequency  influenza vaccine - (6 mos+)(PF) (FLUARIX QUAD/FLULAVAL QUAD) injection 0.5 mL  0.5 mL IntraMUSCular PRIOR TO DISCHARGE  ibuprofen (MOTRIN) tablet 800 mg  800 mg Oral Q8H  
 oxyCODONE-acetaminophen (PERCOCET) 5-325 mg per tablet 1 Tab  1 Tab Oral Q4H PRN  
 oxyCODONE-acetaminophen (PERCOCET 7.5) 7.5-325 mg per tablet 2 Tab  2 Tab Oral Q4H PRN  
 naloxone (NARCAN) injection 0.4 mg  0.4 mg IntraVENous PRN  
 ondansetron (ZOFRAN) injection 8 mg  8 mg IntraVENous Q8H PRN  
 bisacodyl (DULCOLAX) tablet 5 mg  5 mg Oral DAILY PRN  
 diphenhydrAMINE (BENADRYL) capsule 25 mg  25 mg Oral Q4H PRN  
 rho D immune globulin (RHOGAM) 1,500 unit (300 mcg) injection 0.3 mg  300 mcg IntraMUSCular ONCE PRN  
 witch hazel-glycerin (TUCKS) 12.5-50 % pads 1 Pad  1 Pad PeriANAL PRN Assessment and Plan: PPD 1:  Patient appears to be having uncomplicated post-partum course. Continue routine perineal care and maternal education. Plan discharge tomorrow if no problems occur.  Rh positive, rubella + PreE  Thrombocytopenia, recheck platelets tomorrow. Hypokalemia- tx.

## 2019-02-12 NOTE — LACTATION NOTE
In to follow up with mom and ifnant. Mom was pumping. Stated she has no concerns at this time. Lactation consultant will follow up tomorrow.

## 2019-02-12 NOTE — LACTATION NOTE
This note was copied from a baby's chart. Blood sugar check before feeding of 70. Mother pumped a total of 7ml but infant was only able to take 3ml. Infant fed by RN.

## 2019-02-12 NOTE — LACTATION NOTE
This note was copied from a baby's chart. RN assisted with infant latching. Infant unable to latch at this time. Too sleepy. RN expressed colostrum into infants mouth. Blood sugar check before feeding of 61.

## 2019-02-12 NOTE — ANESTHESIA POSTPROCEDURE EVALUATION
* No procedures listed *. Anesthesia Post Evaluation Multimodal analgesia: multimodal analgesia used between 6 hours prior to anesthesia start to PACU discharge Patient location during evaluation: bedside Patient participation: complete - patient participated Level of consciousness: responsive to verbal stimuli Pain management: adequate Airway patency: patent Anesthetic complications: no 
Cardiovascular status: hemodynamically stable Respiratory status: spontaneous ventilation Hydration status: stable Comments: The patient was satisfied with her labor epidural and denies any complications. Her lower extremities have returned to baseline neurologically. Visit Vitals /70 (BP 1 Location: Left arm, BP Patient Position: At rest) Pulse 80 Temp 36.6 °C (97.9 °F) Resp 18 Ht 5' 1\" (1.549 m) Wt 70.3 kg (155 lb) SpO2 98% Breastfeeding? Unknown BMI 29.29 kg/m²

## 2019-02-12 NOTE — LACTATION NOTE
This note was copied from a baby's chart. Pumping education provided and demonstrated by patient. Mother pumped 10ml. RN attempted to feed infant 5 ml with syringe. Infant does not have a strong suck and poor swallowing ordination. Infant was able to get down the 5ml with some spitting.

## 2019-02-12 NOTE — PROGRESS NOTES
Report of care received from, [facility], RN. Bedside report given, pt denies further needs at present time

## 2019-02-12 NOTE — LACTATION NOTE
This note was copied from a baby's chart. Parents are unable to get infant to take any pumped colostrum. RN attempted to feed infant remaining 5ml. Infant was unable to successfully take all 5ml. Most was running out the side of infants mouth. Infant blood sugar remains stable.

## 2019-02-13 VITALS
HEART RATE: 75 BPM | HEIGHT: 61 IN | OXYGEN SATURATION: 96 % | RESPIRATION RATE: 18 BRPM | WEIGHT: 155 LBS | DIASTOLIC BLOOD PRESSURE: 54 MMHG | BODY MASS INDEX: 29.27 KG/M2 | SYSTOLIC BLOOD PRESSURE: 129 MMHG | TEMPERATURE: 98.1 F

## 2019-02-13 LAB
ERYTHROCYTE [DISTWIDTH] IN BLOOD BY AUTOMATED COUNT: 14.5 % (ref 11.9–14.6)
HCT VFR BLD AUTO: 31.6 % (ref 35.8–46.3)
HGB BLD-MCNC: 10 G/DL (ref 11.7–15.4)
MCH RBC QN AUTO: 25.1 PG (ref 26.1–32.9)
MCHC RBC AUTO-ENTMCNC: 31.6 G/DL (ref 31.4–35)
MCV RBC AUTO: 79.4 FL (ref 79.6–97.8)
NRBC # BLD: 0 K/UL (ref 0–0.2)
PLATELET # BLD AUTO: 152 K/UL (ref 150–450)
PMV BLD AUTO: 12.1 FL (ref 9.4–12.3)
RBC # BLD AUTO: 3.98 M/UL (ref 4.05–5.2)
WBC # BLD AUTO: 9.1 K/UL (ref 4.3–11.1)

## 2019-02-13 PROCEDURE — 36415 COLL VENOUS BLD VENIPUNCTURE: CPT

## 2019-02-13 PROCEDURE — 74011250636 HC RX REV CODE- 250/636: Performed by: OBSTETRICS & GYNECOLOGY

## 2019-02-13 PROCEDURE — 74011250637 HC RX REV CODE- 250/637: Performed by: OBSTETRICS & GYNECOLOGY

## 2019-02-13 PROCEDURE — 74011000250 HC RX REV CODE- 250: Performed by: OBSTETRICS & GYNECOLOGY

## 2019-02-13 PROCEDURE — 90715 TDAP VACCINE 7 YRS/> IM: CPT | Performed by: OBSTETRICS & GYNECOLOGY

## 2019-02-13 PROCEDURE — 85027 COMPLETE CBC AUTOMATED: CPT

## 2019-02-13 RX ORDER — IBUPROFEN 400 MG/1
400 TABLET ORAL
Status: DISCONTINUED | OUTPATIENT
Start: 2019-02-13 | End: 2019-02-13 | Stop reason: HOSPADM

## 2019-02-13 RX ORDER — ENOXAPARIN SODIUM 100 MG/ML
40 INJECTION SUBCUTANEOUS EVERY 12 HOURS
Status: DISCONTINUED | OUTPATIENT
Start: 2019-02-13 | End: 2019-02-13

## 2019-02-13 RX ORDER — OXYCODONE AND ACETAMINOPHEN 5; 325 MG/1; MG/1
1 TABLET ORAL
Qty: 30 TAB | Refills: 0 | Status: SHIPPED | OUTPATIENT
Start: 2019-02-13 | End: 2019-02-22 | Stop reason: CLARIF

## 2019-02-13 RX ORDER — IBUPROFEN 400 MG/1
400 TABLET ORAL
Qty: 60 TAB | Refills: 0 | Status: SHIPPED | OUTPATIENT
Start: 2019-02-13 | End: 2019-03-25 | Stop reason: ALTCHOICE

## 2019-02-13 RX ORDER — GUAIFENESIN 100 MG/5ML
100 SOLUTION ORAL
Status: DISCONTINUED | OUTPATIENT
Start: 2019-02-13 | End: 2019-02-13 | Stop reason: HOSPADM

## 2019-02-13 RX ORDER — IPRATROPIUM BROMIDE AND ALBUTEROL SULFATE 2.5; .5 MG/3ML; MG/3ML
3 SOLUTION RESPIRATORY (INHALATION)
Status: COMPLETED | OUTPATIENT
Start: 2019-02-13 | End: 2019-02-13

## 2019-02-13 RX ADMIN — OXYCODONE AND ACETAMINOPHEN 2 TABLET: 5; 325 TABLET ORAL at 00:53

## 2019-02-13 RX ADMIN — POTASSIUM CHLORIDE 20 MEQ: 20 TABLET, EXTENDED RELEASE ORAL at 10:09

## 2019-02-13 RX ADMIN — IBUPROFEN 400 MG: 400 TABLET ORAL at 11:50

## 2019-02-13 RX ADMIN — GUAIFENESIN 100 MG: 200 SOLUTION ORAL at 20:01

## 2019-02-13 RX ADMIN — OXYCODONE AND ACETAMINOPHEN 2 TABLET: 5; 325 TABLET ORAL at 06:37

## 2019-02-13 RX ADMIN — OXYCODONE AND ACETAMINOPHEN 1 TABLET: 5; 325 TABLET ORAL at 19:59

## 2019-02-13 RX ADMIN — TETANUS TOXOID, REDUCED DIPHTHERIA TOXOID AND ACELLULAR PERTUSSIS VACCINE, ADSORBED 0.5 ML: 5; 2.5; 8; 8; 2.5 SUSPENSION INTRAMUSCULAR at 10:09

## 2019-02-13 RX ADMIN — IPRATROPIUM BROMIDE AND ALBUTEROL SULFATE 3 ML: 2.5; .5 SOLUTION RESPIRATORY (INHALATION) at 01:04

## 2019-02-13 RX ADMIN — DOCUSATE SODIUM 100 MG: 100 CAPSULE, LIQUID FILLED ORAL at 10:09

## 2019-02-13 RX ADMIN — SIMETHICONE CHEW TAB 80 MG 80 MG: 80 TABLET ORAL at 20:00

## 2019-02-13 RX ADMIN — GUAIFENESIN 100 MG: 200 SOLUTION ORAL at 00:53

## 2019-02-13 RX ADMIN — IBUPROFEN 400 MG: 400 TABLET ORAL at 19:59

## 2019-02-13 NOTE — PROGRESS NOTES
Patient discharged from Mother Infant room. Discharge teaching complete. Patient verbalizes understanding. Questions encouraged and answered. Prescription given

## 2019-02-13 NOTE — DISCHARGE SUMMARY
Obstetrical Discharge Summary     Name: Jose Kwan MRN: [id number]  SSN: [id number]    YOB: 2000  Age: 22 y.o. Sex: female      Allergies: Patient has no known allergies. Admit Date: 2019    Discharge Date: 2019     Admitting Physician: Erica Mills MD     Attending Physician:  Santosh Hernandez MD     * Admission Diagnoses:  labor [O60.00]; Normal labor [O80, Z37.9]    * Discharge Diagnoses:   Information for the patient's :  Lilian Cuba [[id number]]   Delivery of a 4 lb 8.8 oz (2.065 kg) male infant via Vaginal, Spontaneous on 2019 at 4:28 PM  by . Apgars were 8 and 9. Additional Diagnoses:   Hospital Problems as of 2019 Date Reviewed: 2019          Codes Class Noted - Resolved POA    Mild pre-eclampsia in third trimester ICD-10-CM: O14.03  ICD-9-CM: 642.43  2019 - Present     Overview Addendum 2019 11:48 AM by Jose Sanford MD     New mild bps on admission  Pt progressed to 5/90/-1 on her own with increasing bps. HELLP labs wnl other than  and plts slightly low at 124, P:C 0.4--c/w Pre Eclampsia withOUT severe features. Did have 1 severe bp during a contraction prior to TIFFANY. Will watch bps closely and Mag PRN.                Normal labor ICD-10-CM: [de-identified], Z37.9  ICD-9-CM: 434  2019 - Present Unknown        Smoker ICD-10-CM: F17.200  ICD-9-CM: 305.1  2019 - Present Unknown        Pregnancy affected by fetal growth restriction ICD-10-CM: O36.5990  ICD-9-CM: 656.50  2019 - Present Unknown    Overview Signed 2019  9:24 AM by Jose Sanford MD     19 [facility] bedside US:  Asymmetric IUGR; AC 3.8%, ANJEL 19, BPP 6/8, dopplers wnl             * (Principal)  labor ICD-10-CM: O60.00  ICD-9-CM: 644.00  2019 - Present Unknown             Lab Results   Component Value Date/Time    ABO/Rh(D) B POSITIVE 2019 10:52 AM    Rubella, External Immune 10/08/2018    ABO,Rh B positive 10/08/2018 Immunization History   Administered Date(s) Administered    Tdap 2019       * Procedures: vag delivery        [address]  Depression Scale  I have been able to laugh and see the funny side of things: As much as I always could  I have looked forward with enjoyment to things: As much as I ever did  I have blamed myself unnecessarily when things went wrong: Not very often  I have been anxious or worried for no good reason: No, not at all  I have felt scared or panicky for no very good reason: No, not much  Things have been getting on top of me: No, I have been coping as well as ever  I have been so unhappy that I have had difficulty sleeping: No, not at all  I have felt sad or miserable: No, not at all  I have been so unhappy that I have been crying: No, never  The thought of harming myself has occurred to me: Never  Total Score: 2    * Discharge Condition: good    * Hospital Course: Normal hospital course following the delivery. * Disposition: Home    Discharge Medications:   Current Discharge Medication List          * Follow-up Care/Patient Instructions:   Activity: No sex for 6 weeks and No driving while on analgesics  Diet: Regular Diet  Wound Care: As directed    Follow-up Information    None          Signed By:  Daron Garcia MD     2019

## 2019-02-13 NOTE — PROGRESS NOTES
Dr. Jamie Morelos notified of charge nurse assessment of pt including wheezing in lungs and productive cough (see previous note). Orders received for Robitussin 100mg/5mL PO q 4 hrs prn. Orders verified with readback.

## 2019-02-13 NOTE — PROGRESS NOTES
Dr. Jamie Morelos called back and ordered nebulizing treatment 2.5mg Albuterol. Would like first treatment now. Pt may have q 4 hrs prn following first treatment. Orders verified with readback.

## 2019-02-13 NOTE — LACTATION NOTE
This note was copied from a baby's chart. Lactation visit with 1st time mom, mom has been pumping and giving neosure supplement. Infant is late  at 35 weeks and feeding poorly, just 5-8 mL per feed. Mom getting 3-5 mL colostrum. Infant's bili is HIR and Ped placed under bili lights early this morning. Plan is for mom to dc and infant to stay tonight under lights. Encouraged mom to continue diligently pumping every 2-3 hours and that we can try infant at the breast a few times today. Infant showing hunger cues now, finger suck assessment performed, good suck. Assisted with latch on right side in football. Infant sucks his own tongue and does not open wide so reviewed ways to help infant latch including brushing nose with nipple and placing as much of mom's nipple and areola as possible. Infant able to latch on and off for 10 minutes. Infant goes through some good bursts of sucking and then long pauses. Assisted mom with CTS colostrum feeding of 5 mL and then topping off with neosure of 3 mL. Infant content and asleep at end of feed. Placed infant back under triple lights. Reviewed with mom to continue to pump and attempt at breast every other time, verbalized understanding. Reviewed discharge teaching. Encouraged mom to rent a pump. Patient will look into insurance plan for pump. Lactation to follow up tomorrow regarding infant's discharge.

## 2019-02-13 NOTE — PROGRESS NOTES
Patient called Primary RN to room with c/o of \"not feeling good\". Patient is having hard time verbalizing to Primary RN symptoms. Charge RN to room for assessment. Patient questioned about symptoms. Patient unable to verbalize but then coughs which sounds wet and productive. RN questions patient on mucous and if it has been colored. Patient states she hasn't looked at it. Charge RN assesses patient lungs. Upper and mid airway with expiratory wheezing. Good air movement in bases. Patient states \"it feels like its hard to move air\". Patient denies dyspnea on exertion or pain with breathing. O2 saturation 98%. Patient does note h/o asthma when she was in middle school but has not had to use her inhaler \"for a long time\". Charge RN to discuss assessment with Primary RN.

## 2019-02-13 NOTE — LACTATION NOTE
This note was copied from a baby's chart. Infant mother calls out and requests assistance in attempting to latch infant to breast. Mother had just finished feeding 2cc of pumped colostrum via CTS. Educated mother it would be best to attempt at breast before pumping and encouraged to call out for assistance with attempting to latch before she pumps next time and she verbalizes understanding. Demonstrated how to correctly put infant in football hold. Infant alert at breast, but no latch or sucking observed. Mother is able to express colostrum into infant's mouth at this time. Encouraged skin to skin. Mother denies any further needs at this time.

## 2019-02-13 NOTE — LACTATION NOTE

## 2019-02-13 NOTE — PROGRESS NOTES
Shift assessment complete as noted. Discharge planning discussed,Questions encouraged and answered. Encouraged to call for needs or concerns. Verbalizes understanding.

## 2019-02-13 NOTE — DISCHARGE INSTRUCTIONS
Patient Education        After Your Delivery (the Postpartum Period): Care Instructions  Your Care Instructions    Congratulations on the birth of your baby. Like pregnancy, the  period can be a time of excitement, higinio, and exhaustion. You may look at your wondrous little baby and feel happy. You may also be overwhelmed by your new sleep hours and new responsibilities. At first, babies often sleep during the days and are awake at night. They do not have a pattern or routine. They may make sudden gasps, jerk themselves awake, or look like they have crossed eyes. These are all normal, and they may even make you smile. In these first weeks after delivery, try to take good care of yourself. It may take 4 to 6 weeks to feel like yourself again, and possibly longer if you had a  birth. You will likely feel very tired for several weeks. Your days will be full of ups and downs, but lots of higinio as well. Follow-up care is a key part of your treatment and safety. Be sure to make and go to all appointments, and call your doctor if you are having problems. It's also a good idea to know your test results and keep a list of the medicines you take. How can you care for yourself at home? Take care of your body after delivery  · Use pads instead of tampons for the bloody flow that may last as long as 2 weeks. · Ease cramps with ibuprofen (Advil, Motrin). · Ease soreness of hemorrhoids and the area between your vagina and rectum with ice compresses or witch hazel pads. · Ease constipation by drinking lots of fluid and eating high-fiber foods. Ask your doctor about over-the-counter stool softeners. · Cleanse yourself with a gentle squeeze of warm water from a bottle instead of wiping with toilet paper. · Take a sitz bath in warm water several times a day. · Wear a good nursing bra. Ease sore and swollen breasts with warm, wet washcloths.   · If you are not breastfeeding, use ice rather than heat for breast soreness. · Your period may not start for several months if you are breastfeeding. You may bleed more, and longer at first, than you did before you got pregnant. · Wait until you are healed (about 4 to 6 weeks) before you have sexual intercourse. Your doctor will tell you when it is okay to have sex. · Try not to travel with your baby for 5 or 6 weeks. If you take a long car trip, make frequent stops to walk around and stretch. Avoid exhaustion  · Rest every day. Try to nap when your baby naps. · Ask another adult to be with you for a few days after delivery. · Plan for  if you have other children. · Stay flexible so you can eat at odd hours and sleep when you need to. Both you and your baby are making new schedules. · Plan small trips to get out of the house. Change can make you feel less tired. · Ask for help with housework, cooking, and shopping. Remind yourself that your job is to care for your baby. Know about help for postpartum depression  · \"Baby blues\" are common for the first 1 to 2 weeks after birth. You may cry or feel sad or irritable for no reason. · Rest whenever you can. Being tired makes it harder to handle your emotions. · Go for walks with your baby. · Talk to your partner, friends, and family about your feelings. · If your symptoms last for more than a few weeks, or if you feel very depressed, ask your doctor for help. · Postpartum depression can be treated. Support groups and counseling can help. Sometimes medicine can also help. Stay healthy  · Eat healthy foods so you have more energy and lose extra baby pounds. · If you breastfeed, avoid drugs. If you quit smoking during pregnancy, try to stay smoke-free. If you choose to have a drink now and then, have only one drink, and limit the number of occasions that you have a drink. Wait to breastfeed at least 2 hours after you have a drink to reduce the amount of alcohol the baby may get in the milk.   · Start daily exercise after 4 to 6 weeks, but rest when you feel tired. · Learn exercises to tone your belly. Do Kegel exercises to regain strength in your pelvic muscles. You can do these exercises while you stand or sit. ? Squeeze the same muscles you would use to stop your urine. Your belly and thighs should not move. ? Hold the squeeze for 3 seconds, and then relax for 3 seconds. ? Start with 3 seconds. Then add 1 second each week until you are able to squeeze for 10 seconds. ? Repeat the exercise 10 to 15 times for each session. Do three or more sessions each day. · Find a class for new mothers and new babies that has an exercise time. · If you had a  birth, give yourself a bit more time before you exercise, and be careful. When should you call for help? Call 911 anytime you think you may need emergency care. For example, call if:    · You passed out (lost consciousness).    Call your doctor now or seek immediate medical care if:    · You have severe vaginal bleeding. This means you are passing blood clots and soaking through a pad each hour for 2 or more hours.     · You are dizzy or lightheaded, or you feel like you may faint.     · You have a fever.     · You have new belly pain, or your pain gets worse.    Watch closely for changes in your health, and be sure to contact your doctor if:    · Your vaginal bleeding seems to be getting heavier.     · You have new or worse vaginal discharge.     · You feel sad, anxious, or hopeless for more than a few days.     · You do not get better as expected. Where can you learn more? Go to http://rama-gilbert.info/. Enter A461 in the search box to learn more about \"After Your Delivery (the Postpartum Period): Care Instructions. \"  Current as of: 2018  Content Version: 11.9  © 2179-9465 [employer].  Care instructions adapted under license by [employer] (which disclaims liability or warranty for this information). If you have questions about a medical condition or this instruction, always ask your healthcare professional. [address] any warranty or liability for your use of this information. Patient Education        Learning About Preeclampsia After Childbirth  What is preeclampsia? A woman with preeclampsia has blood pressure that is higher than usual. She may also have other serious symptoms. Preeclampsia can be dangerous. When it is severe, it can cause seizures (eclampsia) or liver or kidney damage. When the liver is affected, some women get HELLP syndrome, a blood-clotting and bleeding problem. HELLP can come on quickly and can be deadly. This is why your doctor checks you and your baby often. Preeclampsia usually occurs after 20 weeks of pregnancy. Most often, it starts near the end of pregnancy and goes away after childbirth. But symptoms may last a few weeks or more and can get worse after delivery. Rarely, symptoms of preeclampsia don't show up until days or even weeks after childbirth. What are the symptoms? Mild preeclampsia usually doesn't cause symptoms. But preeclampsia can cause rapid weight gain and sudden swelling of the hands and face. Severe preeclampsia does cause symptoms. It can cause a very bad headache and trouble seeing and breathing. It also can cause belly pain. You may also urinate less than usual.  If you have new preeclampsia symptoms after you go home from the hospital, call your doctor right away. What can you expect after you have had preeclampsia? In the hospital  After the baby and the placenta are delivered, preeclampsia usually starts to improve. Most women get better in the first few days after childbirth. After having preeclampsia, you still have a risk of seizures for a day or more after childbirth. (Very rarely, seizures happen later on.) So your doctor may have you take magnesium sulfate for a day or more to prevent seizures. You may also take medicine to lower your blood pressure. When you go home  Your blood pressure will most likely return to normal a few days after delivery. Your doctor will want to check your blood pressure sometime in the first week after you leave the hospital.  Some women still have high blood pressure 6 weeks after childbirth. But most return to normal levels over the long term. · Take and record your blood pressure at home if your doctor tells you to. ? Learn the importance of the two measures of blood pressure (such as 120 over 80, or 120/80). The first number is the systolic pressure. This is the force of blood on the artery walls as the heart pumps. The second number is the diastolic pressure. This is the force of blood on the artery walls between heartbeats, when the heart is at rest. You have a choice of monitors to use. § Manual monitor: You pump up the cuff and use a stethoscope to listen for your pulse. § Electronic monitor: The cuff inflates, and a gauge shows your pulse rate. ? To take your blood pressure:  § Ask your doctor to check your blood pressure monitor to be sure that it is accurate and that the cuff fits you. Also ask your doctor to watch you use it, to make sure that you are using it right. § You should not eat, use tobacco products, or use medicine known to raise blood pressure (such as some nasal decongestant sprays) before you take your blood pressure. § Avoid taking your blood pressure if you have just exercised or are nervous or upset. Rest at least 15 minutes before you take your blood pressure. · Be safe with medicines. If you take medicine, take it exactly as prescribed. Call your doctor if you think you are having a problem with your medicine. · Do not smoke. Quitting smoking will help lower your blood pressure and improve your baby's growth and health. If you need help quitting, talk to your doctor about stop-smoking programs and medicines.  These can increase your chances of quitting for good. · Eat a balanced and healthy diet that has lots of fruits and vegetables. Long-term health  After you have had preeclampsia, you have a higher-than-average risk of heart disease, stroke, and kidney disease. This may be because the same things that cause preeclampsia also cause heart and kidney disease. To protect your health, work with your doctor on living a heart-healthy lifestyle and getting the checkups you need. Your doctor may also want you to check your blood pressure at home. Follow-up care is a key part of your treatment and safety. Be sure to make and go to all appointments, and call your doctor if you are having problems. It's also a good idea to know your test results and keep a list of the medicines you take. Where can you learn more? Go to http://rama-gilbert.info/. Enter U864 in the search box to learn more about \"Learning About Preeclampsia After Childbirth. \"  Current as of: September 5, 2018  Content Version: 11.9  © 4221-5652 [employer]. Care instructions adapted under license by [employer] (which disclaims liability or warranty for this information). If you have questions about a medical condition or this instruction, always ask your healthcare professional. [place] [address] any warranty or liability for your use of this information. DISCHARGE SUMMARY from Nurse    PATIENT INSTRUCTIONS:    After general anesthesia or intravenous sedation, for 24 hours or while taking prescription Narcotics:  · Limit your activities  · Do not drive and operate hazardous machinery  · Do not make important personal or business decisions  · Do  not drink alcoholic beverages  · If you have not urinated within 8 hours after discharge, please contact your surgeon on call.     Report the following to your surgeon:  · Excessive pain, swelling, redness or odor of or around the surgical area  · Temperature over 100.5  · Nausea and vomiting lasting longer than 4 hours or if unable to take medications  · Any signs of decreased circulation or nerve impairment to extremity: change in color, persistent  numbness, tingling, coldness or increase pain  · Any questions    What to do at Home:    Nothing in the vagina for 6 weeks. No tub baths or swimming, no driving for 60-72 days, No driving if taking narcotics,Call MD if experiencing heavy vaginal bleeding greater than 1 pad per hour, temperature over 100.4, foul smelling vaginal discharge, thoughts of suicide or homicide, symptoms of postpartum depression or mastitis, or any other major medical concerns. *  Please give a list of your current medications to your Primary Care Provider. *  Please update this list whenever your medications are discontinued, doses are      changed, or new medications (including over-the-counter products) are added. *  Please carry medication information at all times in case of emergency situations. These are general instructions for a healthy lifestyle:    No smoking/ No tobacco products/ Avoid exposure to second hand smoke  Surgeon General's Warning:  Quitting smoking now greatly reduces serious risk to your health. Obesity, smoking, and sedentary lifestyle greatly increases your risk for illness    A healthy diet, regular physical exercise & weight monitoring are important for maintaining a healthy lifestyle    You may be retaining fluid if you have a history of heart failure or if you experience any of the following symptoms:  Weight gain of 3 pounds or more overnight or 5 pounds in a week, increased swelling in our hands or feet or shortness of breath while lying flat in bed. Please call your doctor as soon as you notice any of these symptoms; do not wait until your next office visit.     Recognize signs and symptoms of STROKE:    F-face looks uneven    A-arms unable to move or move unevenly    S-speech slurred or non-existent    T-time-call 911 as soon as signs and symptoms begin-DO NOT go       Back to bed or wait to see if you get better-TIME IS BRAIN. Warning Signs of HEART ATTACK     Call 911 if you have these symptoms:   Chest discomfort. Most heart attacks involve discomfort in the center of the chest that lasts more than a few minutes, or that goes away and comes back. It can feel like uncomfortable pressure, squeezing, fullness, or pain.  Discomfort in other areas of the upper body. Symptoms can include pain or discomfort in one or both arms, the back, neck, jaw, or stomach.  Shortness of breath with or without chest discomfort.  Other signs may include breaking out in a cold sweat, nausea, or lightheadedness. Don't wait more than five minutes to call 911 - MINUTES MATTER! Fast action can save your life. Calling 911 is almost always the fastest way to get lifesaving treatment. Emergency Medical Services staff can begin treatment when they arrive -- up to an hour sooner than if someone gets to the hospital by car. The discharge information has been reviewed with the patient. The patient verbalized understanding. Discharge medications reviewed with the patient and appropriate educational materials and side effects teaching were provided.   ___________________________________________________________________________________________________________________________________

## 2019-02-14 NOTE — PROGRESS NOTES
Patient called out stating she was finished with shower. RN to room with medication per patient request.  Patient dressed and walked to [facility] with infant who is being admitted.

## 2019-02-14 NOTE — PROGRESS NOTES
Patient decided to take a shower once discharge had been completed. This RN received report from outgoing RN, Marc Ye. Patient requesting medication for pain at during bedside report. Patient asked to call out once she is done with shower and this RN will return to give pain medication.

## 2019-02-22 ENCOUNTER — HOSPITAL ENCOUNTER (EMERGENCY)
Age: 19
Discharge: HOME | End: 2019-02-22
Attending: EMERGENCY MEDICINE
Payer: MEDICAID

## 2019-02-22 ENCOUNTER — APPOINTMENT (OUTPATIENT)
Dept: ULTRASOUND IMAGING | Age: 19
End: 2019-02-22
Attending: EMERGENCY MEDICINE
Payer: MEDICAID

## 2019-02-22 VITALS
HEART RATE: 76 BPM | HEIGHT: 61 IN | TEMPERATURE: 98.6 F | RESPIRATION RATE: 17 BRPM | WEIGHT: 127 LBS | OXYGEN SATURATION: 98 % | BODY MASS INDEX: 23.98 KG/M2 | SYSTOLIC BLOOD PRESSURE: 134 MMHG | DIASTOLIC BLOOD PRESSURE: 76 MMHG

## 2019-02-22 DIAGNOSIS — R10.2 PELVIC PAIN: Primary | ICD-10-CM

## 2019-02-22 DIAGNOSIS — N93.9 VAGINAL BLEEDING: ICD-10-CM

## 2019-02-22 LAB
ALBUMIN SERPL-MCNC: 3.2 G/DL (ref 3.5–5)
ALBUMIN/GLOB SERPL: 0.7 {RATIO}
ALP SERPL-CCNC: 157 U/L (ref 50–136)
ALT SERPL-CCNC: 21 U/L (ref 12–65)
ANION GAP SERPL CALC-SCNC: 9 MMOL/L
AST SERPL-CCNC: 25 U/L (ref 15–37)
BACTERIA URNS QL MICRO: 0 /HPF
BASOPHILS # BLD: 0 K/UL (ref 0–0.2)
BASOPHILS NFR BLD: 0 % (ref 0–2)
BILIRUB SERPL-MCNC: 0.3 MG/DL (ref 0.2–1.1)
BUN SERPL-MCNC: 16 MG/DL (ref 6–23)
CALCIUM SERPL-MCNC: 8.6 MG/DL (ref 8.3–10.4)
CASTS URNS QL MICRO: NORMAL /LPF
CHLORIDE SERPL-SCNC: 107 MMOL/L (ref 98–107)
CO2 SERPL-SCNC: 24 MMOL/L (ref 21–32)
CREAT SERPL-MCNC: 0.76 MG/DL (ref 0.6–1)
DIFFERENTIAL METHOD BLD: ABNORMAL
EOSINOPHIL # BLD: 0.1 K/UL (ref 0–0.8)
EOSINOPHIL NFR BLD: 1 % (ref 0.5–7.8)
EPI CELLS #/AREA URNS HPF: NORMAL /HPF
ERYTHROCYTE [DISTWIDTH] IN BLOOD BY AUTOMATED COUNT: 15 % (ref 11.9–14.6)
GLOBULIN SER CALC-MCNC: 4.3 G/DL (ref 2.3–3.5)
GLUCOSE SERPL-MCNC: 95 MG/DL (ref 65–100)
HCG UR QL: NEGATIVE
HCT VFR BLD AUTO: 37.8 % (ref 35.8–46.3)
HGB BLD-MCNC: 12.2 G/DL (ref 11.7–15.4)
IMM GRANULOCYTES # BLD AUTO: 0 K/UL (ref 0–0.5)
IMM GRANULOCYTES NFR BLD AUTO: 0 % (ref 0–5)
LYMPHOCYTES # BLD: 1.3 K/UL (ref 0.5–4.6)
LYMPHOCYTES NFR BLD: 10 % (ref 13–44)
MCH RBC QN AUTO: 25.3 PG (ref 26.1–32.9)
MCHC RBC AUTO-ENTMCNC: 32.3 G/DL (ref 31.4–35)
MCV RBC AUTO: 78.4 FL (ref 79.6–97.8)
MONOCYTES # BLD: 0.7 K/UL (ref 0.1–1.3)
MONOCYTES NFR BLD: 5 % (ref 4–12)
NEUTS SEG # BLD: 10.7 K/UL (ref 1.7–8.2)
NEUTS SEG NFR BLD: 83 % (ref 43–78)
NRBC # BLD: 0 K/UL (ref 0–0.2)
PLATELET # BLD AUTO: 314 K/UL (ref 150–450)
PMV BLD AUTO: 9.9 FL (ref 9.4–12.3)
POTASSIUM SERPL-SCNC: 3.4 MMOL/L (ref 3.5–5.1)
PROT SERPL-MCNC: 7.5 G/DL
RBC # BLD AUTO: 4.82 M/UL (ref 4.05–5.2)
RBC #/AREA URNS HPF: NORMAL /HPF
SODIUM SERPL-SCNC: 140 MMOL/L (ref 136–145)
WBC # BLD AUTO: 12.9 K/UL (ref 4.3–11.1)
WBC URNS QL MICRO: NORMAL /HPF

## 2019-02-22 PROCEDURE — 81015 MICROSCOPIC EXAM OF URINE: CPT

## 2019-02-22 PROCEDURE — 81003 URINALYSIS AUTO W/O SCOPE: CPT | Performed by: EMERGENCY MEDICINE

## 2019-02-22 PROCEDURE — 80053 COMPREHEN METABOLIC PANEL: CPT

## 2019-02-22 PROCEDURE — 81025 URINE PREGNANCY TEST: CPT

## 2019-02-22 PROCEDURE — 85025 COMPLETE CBC W/AUTO DIFF WBC: CPT

## 2019-02-22 PROCEDURE — 99284 EMERGENCY DEPT VISIT MOD MDM: CPT | Performed by: EMERGENCY MEDICINE

## 2019-02-22 PROCEDURE — 93976 VASCULAR STUDY: CPT

## 2019-02-22 PROCEDURE — 74011250637 HC RX REV CODE- 250/637: Performed by: EMERGENCY MEDICINE

## 2019-02-22 RX ORDER — HYDROCODONE BITARTRATE AND ACETAMINOPHEN 5; 325 MG/1; MG/1
1-2 TABLET ORAL
Qty: 20 TAB | Refills: 0 | Status: SHIPPED | OUTPATIENT
Start: 2019-02-22 | End: 2019-03-25 | Stop reason: ALTCHOICE

## 2019-02-22 RX ORDER — PROMETHAZINE HYDROCHLORIDE 25 MG/1
25 TABLET ORAL
Qty: 12 TAB | Refills: 0 | Status: SHIPPED | OUTPATIENT
Start: 2019-02-22 | End: 2019-03-25 | Stop reason: ALTCHOICE

## 2019-02-22 RX ORDER — HYDROCODONE BITARTRATE AND ACETAMINOPHEN 10; 325 MG/1; MG/1
1 TABLET ORAL
Status: COMPLETED | OUTPATIENT
Start: 2019-02-22 | End: 2019-02-22

## 2019-02-22 RX ORDER — NAPROXEN SODIUM 550 MG/1
550 TABLET ORAL 2 TIMES DAILY WITH MEALS
Qty: 20 TAB | Refills: 0 | Status: SHIPPED | OUTPATIENT
Start: 2019-02-22 | End: 2019-03-25 | Stop reason: ALTCHOICE

## 2019-02-22 RX ADMIN — HYDROCODONE BITARTRATE AND ACETAMINOPHEN 1 TABLET: 10; 325 TABLET ORAL at 21:33

## 2019-02-23 NOTE — ED TRIAGE NOTES
Pt complains of waking up with lower abd pain tonight. States she gave birth vaginally on 2/11. Pt reports taking Motrin with no relief. Denies N/V/D.

## 2019-02-23 NOTE — DISCHARGE INSTRUCTIONS
Patient Education      Call the office Monday for recheck if the bleeding picks up you may need a D &C  Return to the ER if worse in any way  Pelvic Pain in Teens: Care Instructions  Your Care Instructions    Pelvic pain, or pain in the lower belly, can have many causes. Often pelvic pain is not serious and gets better in a few days. If your pain continues or gets worse, you may need tests and treatment. Tell your doctor about any new symptoms. These may be signs of a serious problem. Follow-up care is a key part of your treatment and safety. Be sure to make and go to all appointments, and call your doctor if you are having problems. It's also a good idea to know your test results and keep a list of the medicines you take. How can you care for yourself at home? · Rest until you feel better. Lie down, and raise your legs by placing a pillow under your knees. · Drink plenty of fluids. You may find that small, frequent sips are easier on your stomach than if you drink a lot at once. Avoid drinks with carbonation or caffeine, such as soda pop, tea, or coffee. · Try eating several small meals instead of 2 or 3 large ones. Eat mild foods, such as rice, dry toast or crackers, bananas, and applesauce. Avoid fatty and spicy foods, other fruits, and alcohol until 48 hours after your symptoms have gone away. · Take an over-the-counter pain medicine, such as acetaminophen (Tylenol), ibuprofen (Advil, Motrin), or naproxen (Aleve). Read and follow all instructions on the label. · Do not take two or more pain medicines at the same time unless the doctor told you to. Many pain medicines have acetaminophen, which is Tylenol. Too much acetaminophen (Tylenol) can be harmful. · You can put a heating pad, a warm cloth, or moist heat on your belly to relieve pain. When should you call for help? Call your doctor now or seek immediate medical care if:    · You have a new or higher fever.     · You have unusual vaginal bleeding.   · You have new or worse belly or pelvic pain.     · You have vaginal discharge that has increased in amount or smells bad.    Watch closely for changes in your health, and be sure to contact your doctor if:    · You do not get better as expected. Where can you learn more? Go to http://rama-gilbert.info/. Enter G054 in the search box to learn more about \"Pelvic Pain in Teens: Care Instructions. \"  Current as of: May 14, 2018  Content Version: 11.9  © 0969-8960 [employer]. Care instructions adapted under license by [employer] (which disclaims liability or warranty for this information). If you have questions about a medical condition or this instruction, always ask your healthcare professional. [address] any warranty or liability for your use of this information. Patient Education        Vaginal Bleeding in Nonpregnant Women: Care Instructions  Your Care Instructions    Many women have bleeding or spotting between periods. Lots of things can cause it. You may bleed because of hormone problems, stress, or ovulation. Fibroids and IUDs (intrauterine devices) can also cause bleeding. If your bleeding or spotting is caused by one of these things and is not heavy or doesn't happen often, you probably don't need to worry. But in rare cases, infection, cancer, or other serious conditions can cause bleeding. So you may need more tests to find the cause of your bleeding. The doctor has checked you carefully, but problems can develop later. If you notice any problems or new symptoms, get medical treatment right away. Follow-up care is a key part of your treatment and safety. Be sure to make and go to all appointments, and call your doctor if you are having problems. It's also a good idea to know your test results and keep a list of the medicines you take. How can you care for yourself at home?   · Take pain medicines exactly as directed. ? If the doctor gave you a prescription medicine for pain, take it as prescribed. ? If you are not taking a prescription pain medicine, ask your doctor if you can take an over-the-counter medicine. Do not take aspirin, which may make bleeding worse. · If your doctor prescribed birth control pills for your bleeding, take them as directed. · Eat foods that are high in iron and vitamin C. Foods high in iron include red meat, shellfish, eggs, beans, and leafy green vegetables. Foods high in vitamin C include citrus fruits, tomatoes, and broccoli. Ask your doctor if you need to take iron pills or a multivitamin. · Ask your doctor when it is okay to have sex. When should you call for help? Call 911 anytime you think you may need emergency care. For example, call if:    · You passed out (lost consciousness).    Call your doctor now or seek immediate medical care if:    · You have severe vaginal bleeding.     · You are dizzy or lightheaded, or you feel like you may faint.     · You have new or worse belly or pelvic pain.    Watch closely for changes in your health, and be sure to contact your doctor if:    · Your bleeding gets worse.     · You think you might be pregnant.     · You do not get better as expected. Where can you learn more? Go to http://rama-gilbert.info/. Enter L770 in the search box to learn more about \"Vaginal Bleeding in Nonpregnant Women: Care Instructions. \"  Current as of: May 14, 2018  Content Version: 11.9  © 6188-9062 [employer]. Care instructions adapted under license by [employer] (which disclaims liability or warranty for this information). If you have questions about a medical condition or this instruction, always ask your healthcare professional. [address] any warranty or liability for your use of this information.

## 2019-02-23 NOTE — ED NOTES
I have reviewed discharge instructions with the patient. The patient verbalized understanding. Patient left ED via Discharge Method: ambulatory to Home with grandmother. Opportunity for questions and clarification provided. Patient given 3 scripts. To continue your aftercare when you leave the hospital, you may receive an automated call from our care team to check in on how you are doing. This is a free service and part of our promise to provide the best care and service to meet your aftercare needs.  If you have questions, or wish to unsubscribe from this service please call [phone]. Thank you for Choosing our [facility] Emergency Department.

## 2019-02-23 NOTE — ED PROVIDER NOTES
Pt  at 35ish weeks, delivering vaginally on 19 Presents with lower abdominal cramping, and ongoing vaginal bleeding, which she describes as a \"lite period\" Bleeding gradually decreasing day by day. Awoke from a nap this evening with increased pelvic pain, No fevers, no n/v Past Medical History:  
Diagnosis Date  Allergic rhinitis  Eczema  Marijuana abuse  Mild pre-eclampsia in third trimester 2019  Smoker History reviewed. No pertinent surgical history. Family History:  
Problem Relation Age of Onset  Hypertension Mother  Diabetes Father Social History Socioeconomic History  Marital status: SINGLE Spouse name: Not on file  Number of children: Not on file  Years of education: Not on file  Highest education level: Not on file Social Needs  Financial resource strain: Not on file  Food insecurity - worry: Not on file  Food insecurity - inability: Not on file  Transportation needs - medical: Not on file  Transportation needs - non-medical: Not on file Occupational History  Not on file Tobacco Use  Smoking status: Light Tobacco Smoker  Smokeless tobacco: Never Used  Tobacco comment: 19 pt was smoking 6x/daily at beg of pregnancy. now down to 2. Substance and Sexual Activity  Alcohol use: No  
  Frequency: Never  Drug use: No  
 Sexual activity: Yes  
  Partners: Male Birth control/protection: None Other Topics Concern [address] Service Not Asked  Blood Transfusions Not Asked  Caffeine Concern Not Asked  Occupational Exposure Not Asked Reubin Castellani Hazards Not Asked  Sleep Concern Not Asked  Stress Concern Not Asked  Weight Concern Not Asked  Special Diet Not Asked  Back Care Not Asked  Exercise Not Asked  Bike Helmet Not Asked  Seat Belt Not Asked  Self-Exams Not Asked Social History Narrative First name pronounced: \"Ni-yah\" (pronounced  \"Eye-yah\") ALLERGIES: Patient has no known allergies. Review of Systems Constitutional: Negative for chills and fever. HENT: Negative for rhinorrhea and sore throat. Eyes: Negative for discharge and redness. Respiratory: Negative for cough and shortness of breath. Gastrointestinal: Negative for abdominal pain, nausea and vomiting. Genitourinary: Positive for pelvic pain and vaginal bleeding. Negative for dysuria and vaginal discharge. Musculoskeletal: Negative for arthralgias and back pain. Skin: Negative for rash. Neurological: Negative for dizziness and headaches. All other systems reviewed and are negative. Vitals:  
 02/22/19 1927 02/22/19 2311 BP: 135/76 134/76 Pulse: 91 76 Resp: 18 17 Temp: 98.6 °F (37 °C) SpO2: 98% 98% Weight: 57.6 kg (127 lb) Height: 5' 1\" (1.549 m) Physical Exam  
Constitutional: She is oriented to person, place, and time. She appears well-developed and well-nourished. HENT:  
Head: Normocephalic and atraumatic. Eyes: Conjunctivae are normal. Pupils are equal, round, and reactive to light. Right eye exhibits no discharge. Left eye exhibits no discharge. No scleral icterus. Neck: Normal range of motion. Neck supple. Cardiovascular: Normal rate, regular rhythm and normal heart sounds. Exam reveals no gallop. No murmur heard. Pulmonary/Chest: Effort normal and breath sounds normal. No respiratory distress. She has no wheezes. She has no rales. Abdominal: Soft. Bowel sounds are normal. There is tenderness in the right lower quadrant, suprapubic area and left lower quadrant. There is no guarding. Mild pain Musculoskeletal: Normal range of motion. She exhibits no edema. Neurological: She is alert and oriented to person, place, and time. She exhibits normal muscle tone. cni 2-12 grossly Skin: Skin is warm and dry. She is not diaphoretic. Psychiatric: She has a normal mood and affect. Her behavior is normal.  
Nursing note and vitals reviewed. MDM Number of Diagnoses or Management Options Pelvic pain:  
Vaginal bleeding:  
Diagnosis management comments: Medical decision making note: 
Pelvic pan, ongoing vag bleed s/p near-term- Us and labs ok f/u This concludes the \"medical decision making note\" part of this emergency department visit note.' Procedures

## 2019-02-26 ENCOUNTER — TELEPHONE (OUTPATIENT)
Dept: CASE MANAGEMENT | Age: 19
End: 2019-02-26

## 2019-02-26 NOTE — TELEPHONE ENCOUNTER
Phone call received from Lluvia Sims with [facility] Department. Tamra Wong has been provided with results of umbilical drug screen, and he is now requesting copies of patient's + UDS for THC during pregnancy. Copies of + UDS from 1/29/19 and 2/7/19 faxed to Tamra Wong (F: [phone]).     Tr Davis, [address] [facility]

## 2019-02-26 NOTE — TELEPHONE ENCOUNTER
Phone call placed to Lluvia Sims to inform that umbilical drug screen was + for hydromorphone due to dilaudid given to patient while in the hospital.    Feng Marroquin [address]

## 2019-08-26 ENCOUNTER — HOSPITAL ENCOUNTER (EMERGENCY)
Age: 19
Discharge: HOME | End: 2019-08-26
Attending: EMERGENCY MEDICINE
Payer: SELF-PAY

## 2019-08-26 VITALS
BODY MASS INDEX: 28.32 KG/M2 | SYSTOLIC BLOOD PRESSURE: 130 MMHG | RESPIRATION RATE: 18 BRPM | DIASTOLIC BLOOD PRESSURE: 71 MMHG | OXYGEN SATURATION: 99 % | HEIGHT: 61 IN | HEART RATE: 90 BPM | WEIGHT: 150 LBS | TEMPERATURE: 99 F

## 2019-08-26 DIAGNOSIS — N30.00 ACUTE CYSTITIS WITHOUT HEMATURIA: Primary | ICD-10-CM

## 2019-08-26 LAB
BACTERIA URNS QL MICRO: 0 /HPF
CASTS URNS QL MICRO: 0 /LPF
CRYSTALS URNS QL MICRO: 0 /LPF
EPI CELLS #/AREA URNS HPF: 0 /HPF
HCG UR QL: NEGATIVE
MUCOUS THREADS URNS QL MICRO: 0 /LPF
OTHER OBSERVATIONS,UCOM: NORMAL
RBC #/AREA URNS HPF: NORMAL /HPF
WBC URNS QL MICRO: NORMAL /HPF

## 2019-08-26 PROCEDURE — 99283 EMERGENCY DEPT VISIT LOW MDM: CPT | Performed by: NURSE PRACTITIONER

## 2019-08-26 PROCEDURE — 81015 MICROSCOPIC EXAM OF URINE: CPT

## 2019-08-26 PROCEDURE — 81003 URINALYSIS AUTO W/O SCOPE: CPT | Performed by: NURSE PRACTITIONER

## 2019-08-26 PROCEDURE — 81025 URINE PREGNANCY TEST: CPT

## 2019-08-26 RX ORDER — CIPROFLOXACIN 500 MG/1
500 TABLET ORAL 2 TIMES DAILY
Qty: 14 TAB | Refills: 0 | Status: SHIPPED | OUTPATIENT
Start: 2019-08-26 | End: 2019-09-02

## 2019-08-26 NOTE — DISCHARGE INSTRUCTIONS
Medication as prescribed. Follow up with your primary care provider for a recheck if symptoms fail to improve or worsen   Return to the emergency department as needed.

## 2019-08-26 NOTE — ED TRIAGE NOTES
Pt in states dysuria and urinary frequency since Saturday. States lower back pain. Denies n/v/d/fever.

## 2019-08-26 NOTE — ED PROVIDER NOTES
Patient presents with hematuria, urinary frequency, and bilateral flank pain. She states symptoms have gotten progressively worse since Saturday. She denies fever, nausea, and vomiting. The history is provided by the patient. Past Medical History:   Diagnosis Date    Allergic rhinitis     Eczema     Marijuana abuse     Mild pre-eclampsia in third trimester 02/11/2019    Smoker        History reviewed. No pertinent surgical history. Family History:   Problem Relation Age of Onset    Hypertension Mother     Diabetes Father        Social History     Socioeconomic History    Marital status: SINGLE     Spouse name: Not on file    Number of children: Not on file    Years of education: Not on file    Highest education level: Not on file   Occupational History    Not on file   Social Needs    Financial resource strain: Not on file    Food insecurity:     Worry: Not on file     Inability: Not on file    Transportation needs:     Medical: Not on file     Non-medical: Not on file   Tobacco Use    Smoking status: Light Tobacco Smoker    Smokeless tobacco: Never Used    Tobacco comment: 1/22/19 pt was smoking 6x/daily at beg of pregnancy. now down to 2.    Substance and Sexual Activity    Alcohol use: No     Frequency: Never    Drug use: No    Sexual activity: Yes     Partners: Male     Birth control/protection: None   Lifestyle    Physical activity:     Days per week: Not on file     Minutes per session: Not on file    Stress: Not on file   Relationships    Social connections:     Talks on phone: Not on file     Gets together: Not on file     Attends Anabaptist service: Not on file     Active member of club or organization: Not on file     Attends meetings of clubs or organizations: Not on file     Relationship status: Not on file    Intimate partner violence:     Fear of current or ex partner: Not on file     Emotionally abused: Not on file     Physically abused: Not on file     Forced sexual activity: Not on file   Other Topics Concern     Service Not Asked    Blood Transfusions Not Asked    Caffeine Concern Not Asked    Occupational Exposure Not Asked    Hobby Hazards Not Asked    Sleep Concern Not Asked    Stress Concern Not Asked    Weight Concern Not Asked    Special Diet Not Asked    Back Care Not Asked    Exercise Not Asked    Bike Helmet Not Asked   [address] Not Asked    Self-Exams Not Asked   Social History Narrative    First name pronounced: \"Ni-yah\" (pronounced  \"Eye-yah\")         ALLERGIES: Patient has no known allergies. Review of Systems   Constitutional: Negative for fever. Gastrointestinal: Negative for nausea and vomiting. Genitourinary: Positive for dysuria, flank pain, frequency and hematuria. Vitals:    08/26/19 1759   BP: 130/71   Pulse: 90   Resp: 18   Temp: 99 °F (37.2 °C)   SpO2: 99%   Weight: 68 kg (150 lb)   Height: 5' 1\" (1.549 m)            Physical Exam   Constitutional: She is oriented to person, place, and time. She appears well-developed and well-nourished. No distress. HENT:   Head: Normocephalic and atraumatic. Eyes: Conjunctivae and EOM are normal.   Neck: Normal range of motion. Neck supple. Cardiovascular: Normal rate and regular rhythm. Pulmonary/Chest: Effort normal and breath sounds normal.   Abdominal: Soft. She exhibits no distension. There is no tenderness. Musculoskeletal:        Lumbar back: She exhibits tenderness and pain. Back:    Neurological: She is alert and oriented to person, place, and time. Skin: Skin is warm and dry. She is not diaphoretic. Psychiatric: She has a normal mood and affect. Her behavior is normal.   Nursing note and vitals reviewed.        Recent Results (from the past 12 hour(s))   HCG URINE, QL. - POC    Collection Time: 08/26/19  6:54 PM   Result Value Ref Range    Pregnancy test,urine (POC) NEGATIVE  NEG     URINE MICROSCOPIC    Collection Time: 08/26/19  6:58 PM Result Value Ref Range    WBC  0 /hpf    RBC 0-3 0 /hpf    Epithelial cells 0 0 /hpf    Bacteria 0 0 /hpf    Casts 0 0 /lpf    Crystals, urine 0 0 /LPF    Mucus 0 0 /lpf    Other observations RESULTS VERIFIED MANUALLY         MDM  Number of Diagnoses or Management Options  Acute cystitis without hematuria:      Amount and/or Complexity of Data Reviewed  Clinical lab tests: ordered and reviewed    Risk of Complications, Morbidity, and/or Mortality  Presenting problems: low  Diagnostic procedures: low  Management options: low    Patient Progress  Patient progress: stable         Procedures

## 2019-08-27 NOTE — ED NOTES
I have reviewed discharge instructions with the patient. The patient verbalized understanding. Patient left ED via Discharge Method: ambulatory to Home with  family). Opportunity for questions and clarification provided. Patient given 1 scripts. To continue your aftercare when you leave the hospital, you may receive an automated call from our care team to check in on how you are doing. This is a free service and part of our promise to provide the best care and service to meet your aftercare needs.  If you have questions, or wish to unsubscribe from this service please call [phone]. Thank you for Choosing our [facility].

## 2020-05-27 PROBLEM — F41.9 ANXIETY: Status: ACTIVE | Noted: 2018-10-18

## 2020-05-27 PROBLEM — Z72.0 TOBACCO ABUSE: Status: ACTIVE | Noted: 2018-10-18

## 2020-05-27 PROBLEM — Z71.89 ENCOUNTER FOR BREAST FEEDING COUNSELING: Status: ACTIVE | Noted: 2018-10-18

## 2021-04-27 ENCOUNTER — APPOINTMENT (OUTPATIENT)
Dept: ULTRASOUND IMAGING | Age: 21
End: 2021-04-27
Attending: EMERGENCY MEDICINE
Payer: MEDICAID

## 2021-04-27 ENCOUNTER — APPOINTMENT (OUTPATIENT)
Dept: CT IMAGING | Age: 21
End: 2021-04-27
Attending: EMERGENCY MEDICINE
Payer: MEDICAID

## 2021-04-27 ENCOUNTER — HOSPITAL ENCOUNTER (EMERGENCY)
Age: 21
Discharge: HOME | End: 2021-04-27
Attending: EMERGENCY MEDICINE
Payer: MEDICAID

## 2021-04-27 VITALS
RESPIRATION RATE: 20 BRPM | TEMPERATURE: 100 F | WEIGHT: 140 LBS | SYSTOLIC BLOOD PRESSURE: 107 MMHG | OXYGEN SATURATION: 97 % | DIASTOLIC BLOOD PRESSURE: 81 MMHG | HEIGHT: 61 IN | BODY MASS INDEX: 26.43 KG/M2 | HEART RATE: 119 BPM

## 2021-04-27 DIAGNOSIS — R10.84 ABDOMINAL PAIN, GENERALIZED: Primary | ICD-10-CM

## 2021-04-27 LAB
ALBUMIN SERPL-MCNC: 4.1 G/DL (ref 3.5–5)
ALBUMIN/GLOB SERPL: 1 {RATIO} (ref 1.2–3.5)
ALP SERPL-CCNC: 127 U/L (ref 50–130)
ALT SERPL-CCNC: 18 U/L (ref 12–65)
ANION GAP SERPL CALC-SCNC: 8 MMOL/L (ref 7–16)
AST SERPL-CCNC: 17 U/L (ref 15–37)
BACTERIA URNS QL MICRO: 0 /HPF
BASOPHILS # BLD: 0.1 K/UL (ref 0–0.2)
BASOPHILS NFR BLD: 0 % (ref 0–2)
BILIRUB SERPL-MCNC: 0.3 MG/DL (ref 0.2–1.1)
BUN SERPL-MCNC: 9 MG/DL (ref 6–23)
CALCIUM SERPL-MCNC: 9.3 MG/DL (ref 8.3–10.4)
CASTS URNS QL MICRO: ABNORMAL /LPF
CHLORIDE SERPL-SCNC: 107 MMOL/L (ref 98–107)
CO2 SERPL-SCNC: 24 MMOL/L (ref 21–32)
CREAT SERPL-MCNC: 0.66 MG/DL (ref 0.6–1)
DIFFERENTIAL METHOD BLD: ABNORMAL
EOSINOPHIL # BLD: 0 K/UL (ref 0–0.8)
EOSINOPHIL NFR BLD: 0 % (ref 0.5–7.8)
EPI CELLS #/AREA URNS HPF: ABNORMAL /HPF
ERYTHROCYTE [DISTWIDTH] IN BLOOD BY AUTOMATED COUNT: 14.5 % (ref 11.9–14.6)
GLOBULIN SER CALC-MCNC: 4.3 G/DL (ref 2.3–3.5)
GLUCOSE SERPL-MCNC: 94 MG/DL (ref 65–100)
HCG SERPL-ACNC: 4 MIU/ML (ref 0–6)
HCG UR QL: NEGATIVE
HCT VFR BLD AUTO: 36.1 % (ref 35.8–46.3)
HGB BLD-MCNC: 11.6 G/DL (ref 11.7–15.4)
IMM GRANULOCYTES # BLD AUTO: 0.1 K/UL (ref 0–0.5)
IMM GRANULOCYTES NFR BLD AUTO: 0 % (ref 0–5)
LACTATE SERPL-SCNC: 0.8 MMOL/L (ref 0.4–2)
LACTATE SERPL-SCNC: 2.5 MMOL/L (ref 0.4–2)
LIPASE SERPL-CCNC: 112 U/L (ref 73–393)
LYMPHOCYTES # BLD: 1.6 K/UL (ref 0.5–4.6)
LYMPHOCYTES NFR BLD: 7 % (ref 13–44)
MCH RBC QN AUTO: 24.5 PG (ref 26.1–32.9)
MCHC RBC AUTO-ENTMCNC: 32.1 G/DL (ref 31.4–35)
MCV RBC AUTO: 76.2 FL (ref 79.6–97.8)
MONOCYTES # BLD: 0.9 K/UL (ref 0.1–1.3)
MONOCYTES NFR BLD: 4 % (ref 4–12)
NEUTS SEG # BLD: 19.7 K/UL (ref 1.7–8.2)
NEUTS SEG NFR BLD: 88 % (ref 43–78)
NRBC # BLD: 0 K/UL (ref 0–0.2)
PLATELET # BLD AUTO: 305 K/UL (ref 150–450)
PMV BLD AUTO: 9.6 FL (ref 9.4–12.3)
POTASSIUM SERPL-SCNC: 3.8 MMOL/L (ref 3.5–5.1)
PROT SERPL-MCNC: 8.4 G/DL (ref 6.3–8.2)
RBC # BLD AUTO: 4.74 M/UL (ref 4.05–5.2)
RBC #/AREA URNS HPF: ABNORMAL /HPF
SERVICE CMNT-IMP: NORMAL
SODIUM SERPL-SCNC: 139 MMOL/L (ref 136–145)
WBC # BLD AUTO: 22.4 K/UL (ref 4.3–11.1)
WBC URNS QL MICRO: >100 /HPF
WET PREP GENITAL: NORMAL
WET PREP GENITAL: NORMAL

## 2021-04-27 PROCEDURE — 96366 THER/PROPH/DIAG IV INF ADDON: CPT

## 2021-04-27 PROCEDURE — 74011000636 HC RX REV CODE- 636: Performed by: EMERGENCY MEDICINE

## 2021-04-27 PROCEDURE — 74177 CT ABD & PELVIS W/CONTRAST: CPT

## 2021-04-27 PROCEDURE — 85025 COMPLETE CBC W/AUTO DIFF WBC: CPT

## 2021-04-27 PROCEDURE — 74011250636 HC RX REV CODE- 250/636: Performed by: EMERGENCY MEDICINE

## 2021-04-27 PROCEDURE — 83605 ASSAY OF LACTIC ACID: CPT

## 2021-04-27 PROCEDURE — 99284 EMERGENCY DEPT VISIT MOD MDM: CPT

## 2021-04-27 PROCEDURE — 96375 TX/PRO/DX INJ NEW DRUG ADDON: CPT

## 2021-04-27 PROCEDURE — 84702 CHORIONIC GONADOTROPIN TEST: CPT

## 2021-04-27 PROCEDURE — 96365 THER/PROPH/DIAG IV INF INIT: CPT

## 2021-04-27 PROCEDURE — 80053 COMPREHEN METABOLIC PANEL: CPT

## 2021-04-27 PROCEDURE — 87040 BLOOD CULTURE FOR BACTERIA: CPT

## 2021-04-27 PROCEDURE — 87591 N.GONORRHOEAE DNA AMP PROB: CPT

## 2021-04-27 PROCEDURE — 36415 COLL VENOUS BLD VENIPUNCTURE: CPT

## 2021-04-27 PROCEDURE — 81025 URINE PREGNANCY TEST: CPT

## 2021-04-27 PROCEDURE — 83690 ASSAY OF LIPASE: CPT

## 2021-04-27 PROCEDURE — 74011000258 HC RX REV CODE- 258: Performed by: EMERGENCY MEDICINE

## 2021-04-27 PROCEDURE — 81015 MICROSCOPIC EXAM OF URINE: CPT

## 2021-04-27 PROCEDURE — 87210 SMEAR WET MOUNT SALINE/INK: CPT

## 2021-04-27 PROCEDURE — 76830 TRANSVAGINAL US NON-OB: CPT

## 2021-04-27 RX ORDER — HYDROMORPHONE HYDROCHLORIDE 1 MG/ML
1 INJECTION, SOLUTION INTRAMUSCULAR; INTRAVENOUS; SUBCUTANEOUS ONCE
Status: COMPLETED | OUTPATIENT
Start: 2021-04-27 | End: 2021-04-27

## 2021-04-27 RX ORDER — HYDROCODONE BITARTRATE AND ACETAMINOPHEN 7.5; 325 MG/1; MG/1
1 TABLET ORAL
Qty: 17 TAB | Refills: 0 | Status: SHIPPED | OUTPATIENT
Start: 2021-04-27 | End: 2021-05-02

## 2021-04-27 RX ORDER — DOXYCYCLINE HYCLATE 100 MG
100 TABLET ORAL 2 TIMES DAILY
Qty: 20 TAB | Refills: 0 | Status: SHIPPED | OUTPATIENT
Start: 2021-04-27 | End: 2021-05-07

## 2021-04-27 RX ORDER — ONDANSETRON 4 MG/1
4 TABLET, FILM COATED ORAL
Qty: 15 TAB | Refills: 0 | Status: SHIPPED | OUTPATIENT
Start: 2021-04-27 | End: 2021-06-08 | Stop reason: ALTCHOICE

## 2021-04-27 RX ORDER — SODIUM CHLORIDE 0.9 % (FLUSH) 0.9 %
10 SYRINGE (ML) INJECTION
Status: COMPLETED | OUTPATIENT
Start: 2021-04-27 | End: 2021-04-27

## 2021-04-27 RX ORDER — ONDANSETRON 2 MG/ML
4 INJECTION INTRAMUSCULAR; INTRAVENOUS
Status: COMPLETED | OUTPATIENT
Start: 2021-04-27 | End: 2021-04-27

## 2021-04-27 RX ADMIN — CEFTRIAXONE 1 G: 1 INJECTION, POWDER, FOR SOLUTION INTRAMUSCULAR; INTRAVENOUS at 08:20

## 2021-04-27 RX ADMIN — Medication 10 ML: at 08:37

## 2021-04-27 RX ADMIN — SODIUM CHLORIDE 1000 ML: 900 INJECTION, SOLUTION INTRAVENOUS at 06:50

## 2021-04-27 RX ADMIN — HYDROMORPHONE HYDROCHLORIDE 1 MG: 1 INJECTION, SOLUTION INTRAMUSCULAR; INTRAVENOUS; SUBCUTANEOUS at 06:50

## 2021-04-27 RX ADMIN — SODIUM CHLORIDE 100 ML: 900 INJECTION, SOLUTION INTRAVENOUS at 08:37

## 2021-04-27 RX ADMIN — IOPAMIDOL 100 ML: 755 INJECTION, SOLUTION INTRAVENOUS at 08:37

## 2021-04-27 RX ADMIN — ONDANSETRON 4 MG: 2 INJECTION INTRAMUSCULAR; INTRAVENOUS at 06:50

## 2021-04-27 NOTE — ED PROVIDER NOTES
Patient has no significant past medical history, is complaining of abdominal pain. She states it started yesterday afternoon. She had vomited before that after eating lunch but states she had no pain at that time. Around 3 PM she started getting diffuse mild pain. She describes it as sharp pain \"almost like a period\". She woke up sometime this morning which much more severe pain. Again she states that is all over but more prominent in her lower abdomen. She denies any urinary symptoms, denies any vaginal discharge. She denies any diarrhea. She was taking Aleve and Motrin yesterday without any improvement in her symptoms. Past Medical History:   Diagnosis Date    Allergic rhinitis     Eczema     Marijuana abuse     Mild pre-eclampsia in third trimester 02/11/2019    Smoker        History reviewed. No pertinent surgical history. Family History:   Problem Relation Age of Onset    Hypertension Mother     Diabetes Father        Social History     Socioeconomic History    Marital status: SINGLE     Spouse name: Not on file    Number of children: Not on file    Years of education: Not on file    Highest education level: Not on file   Occupational History    Not on file   Social Needs    Financial resource strain: Not on file    Food insecurity     Worry: Not on file     Inability: Not on file    Transportation needs     Medical: Not on file     Non-medical: Not on file   Tobacco Use    Smoking status: Light Tobacco Smoker    Smokeless tobacco: Never Used    Tobacco comment: 1/22/19 pt was smoking 6x/daily at beg of pregnancy. now down to 2.    Substance and Sexual Activity    Alcohol use: Yes     Frequency: Never    Drug use: No    Sexual activity: Yes     Partners: Male     Birth control/protection: None   Lifestyle    Physical activity     Days per week: Not on file     Minutes per session: Not on file    Stress: Not on file   Relationships    Social connections     Talks on phone: Not on file     Gets together: Not on file     Attends Scientology service: Not on file     Active member of club or organization: Not on file     Attends meetings of clubs or organizations: Not on file     Relationship status: Not on file    Intimate partner violence     Fear of current or ex partner: Not on file     Emotionally abused: Not on file     Physically abused: Not on file     Forced sexual activity: Not on file   Other Topics Concern     Service Not Asked    Blood Transfusions Not Asked    Caffeine Concern Not Asked    Occupational Exposure Not Asked   Donnita Munda Hazards Not Asked    Sleep Concern Not Asked    Stress Concern Not Asked    Weight Concern Not Asked    Special Diet Not Asked    Back Care Not Asked    Exercise Not Asked    Bike Helmet Not Asked   [address] Not Asked    Self-Exams Not Asked   Social History Narrative    First name pronounced: \"Ni-yah\" (pronounced  \"Eye-yah\")         ALLERGIES: Patient has no known allergies. Review of Systems   Constitutional: Negative for chills and fever. Gastrointestinal: Positive for abdominal pain. Negative for nausea and vomiting. All other systems reviewed and are negative. Vitals:    04/27/21 0630 04/27/21 0639   BP:  (!) 146/82   Pulse: (!) 127    Resp:  20   Temp:  100 °F (37.8 °C)   SpO2: 100% 100%   Weight:  63.5 kg (140 lb)   Height:  5' 1\" (1.549 m)            Physical Exam  Vitals signs and nursing note reviewed. Constitutional:       Appearance: Normal appearance. She is well-developed and normal weight. Comments: Patient appears uncomfortable, is crying   HENT:      Head: Normocephalic and atraumatic. Eyes:      Conjunctiva/sclera: Conjunctivae normal.      Pupils: Pupils are equal, round, and reactive to light. Neck:      Musculoskeletal: Normal range of motion and neck supple. Cardiovascular:      Rate and Rhythm: Regular rhythm. Tachycardia present.    Pulmonary:      Effort: Pulmonary effort is normal. No respiratory distress. Abdominal:      General: There is no distension. Tenderness: There is abdominal tenderness. Comments: Diffuse tenderness to light palpation more prominent in her lower abdomen   Genitourinary:     Vagina: Vaginal discharge present. Comments: Moderate amount of yellow vaginal discharge is present. No adnexal tenderness on bimanual exam, mild to moderate tenderness to palpation of the cervix  Musculoskeletal:         General: No tenderness. Right lower leg: No edema. Left lower leg: No edema. Skin:     General: Skin is warm and dry. Neurological:      Mental Status: She is alert and oriented to person, place, and time. MDM  Number of Diagnoses or Management Options  Abdominal pain, generalized: new and requires workup  Diagnosis management comments: 8:07 AM patient now says she had an  1 month ago. Pelvic exam shows some mild to moderate cervical motion tenderness, white count is markedly elevated. Will get CT scan. 10:01 AM  CT abdomen pelvis unremarkable    Reviewed findings with Dr. Venkat Adamson  Requests pelvic ultrasound.   Requests beta-hCG    We will follow    12:43 PM  Ultrasound reveals some small ovarian cyst but no other signs of acute pathology in the pelvis  Reviewed findings with Bernadette ACEVEDO  Advises discharge  Doxycycline pain control  Close office follow-up       Amount and/or Complexity of Data Reviewed  Clinical lab tests: ordered and reviewed  Tests in the radiology section of CPT®: ordered and reviewed  Tests in the medicine section of CPT®: ordered and reviewed  Decide to obtain previous medical records or to obtain history from someone other than the patient: yes  Review and summarize past medical records: yes  Discuss the patient with other providers: yes  Independent visualization of images, tracings, or specimens: yes    Risk of Complications, Morbidity, and/or Mortality  Presenting problems: moderate  Diagnostic procedures: moderate  Management options: moderate  General comments: Elements of this note have been dictated via voice recognition software. Text and phrases may be limited by the accuracy of the software. The chart has been reviewed, but errors may still be present.       Patient Progress  Patient progress: improved         Procedures

## 2021-04-27 NOTE — LETTER
[address] [address] EMERGENCY DEPT 
[address] [facility] [place] [address] 
[phone] Work/School Note Date: 4/27/2021 To Whom It May concern: 
 
Suzanna Sargent was seen and treated today in the emergency room by the following provider(s): 
No providers found. Suzanna Sargent may return to work on 4/29/21. Sincerely, Vega Barrera RN

## 2021-04-27 NOTE — ED NOTES
I have reviewed discharge instructions with the patient. The patient verbalized understanding. Patient left ED via Discharge Method: ambulatory to Home with self. Opportunity for questions and clarification provided. Patient given 3 scripts. To continue your aftercare when you leave the hospital, you may receive an automated call from our care team to check in on how you are doing. This is a free service and part of our promise to provide the best care and service to meet your aftercare needs.  If you have questions, or wish to unsubscribe from this service please call [phone]. Thank you for Choosing our [facility] Emergency Department.

## 2021-04-27 NOTE — ED TRIAGE NOTES
Patient complain of belly pain that started yesterday. Patient say that she eat mac and cheese. She said the pain was not that bad till 2100.

## 2021-04-27 NOTE — DISCHARGE INSTRUCTIONS
You must finish all the prescribed antibiotics. THERE SHOULD BE NO LEFT OVER PILLS!!   Do not drink alcohol or drive while taking the prescription pain medications  Call your doctor/follow up doctor to set up appointment for recheck visit    Call your gynecologist today to set up an office recheck appointment for the next 2 to 3 days    If you develop fever, vomiting and cannot hold down your medications please return to the ER for reevaluation

## 2021-04-29 LAB
C TRACH RRNA SPEC QL NAA+PROBE: NEGATIVE
N GONORRHOEA RRNA SPEC QL NAA+PROBE: POSITIVE
PLEASE NOTE:, 188601: ABNORMAL
SPECIMEN SOURCE: ABNORMAL

## 2021-05-02 LAB
BACTERIA SPEC CULT: NORMAL
BACTERIA SPEC CULT: NORMAL
SERVICE CMNT-IMP: NORMAL
SERVICE CMNT-IMP: NORMAL

## 2021-05-05 NOTE — PROGRESS NOTES
ED pharmacist successfully contacted Jaycob Wylie on 05/05/21 regarding the recent results of their STI culture(s). The patient has been appropriately treated in the emergency department prior to discharge for the identified infection with ceftriaxone 1 gm IV x1. The patient has been instructed to inform all anal, vaginal, and/or oral sex partners of the past 60 days since symptom onset of positive STI result(s). Jaycob Wylie has also been instructed to abstain from sexual encounters until after receiving treatment and symptoms have resolved. Patient has been educated to practice safe sex by using a condom during any sexual encounters. The patient has been advised to follow-up with their primary care provider or return to the ED if symptoms do not resolve or worsen.     Allergies as of 04/27/2021    (No Known Allergies)     Contains abnormal data CHLAMYDIA / GC-AMPLIFIED  Order: [id number]  Status:  Final result   Visible to patient:  Yes (MyChart)   Ref Range & Units 8d ago 2yr ago   Source  CERVICAL SWA     Chlamydia trachomatis, COURTNEY Negative   Negative  Negative R    Comment: (NOTE)   Performed At: [address] [id number]   Pankaj South MD [id number]    Neisseria gonorrhoeae, COURTNEY Negative   PositiveAbnormal   Negative R    Comment: (NOTE)   Performed At: [address] [id number]   Pankaj South MD [id number]    Please note   <<DO NOT REPORT>>     Resulting [phone]         Specimen Collected: 04/27/21  8:04 AM Last Resulted: 04/29/21 10:35 PM

## 2021-07-29 ENCOUNTER — APPOINTMENT (OUTPATIENT)
Dept: ULTRASOUND IMAGING | Age: 21
End: 2021-07-29
Attending: EMERGENCY MEDICINE
Payer: MEDICAID

## 2021-07-29 ENCOUNTER — HOSPITAL ENCOUNTER (EMERGENCY)
Age: 21
Discharge: HOME | End: 2021-07-29
Attending: EMERGENCY MEDICINE
Payer: MEDICAID

## 2021-07-29 VITALS
SYSTOLIC BLOOD PRESSURE: 154 MMHG | TEMPERATURE: 98.4 F | HEIGHT: 61 IN | HEART RATE: 77 BPM | DIASTOLIC BLOOD PRESSURE: 79 MMHG | WEIGHT: 147 LBS | OXYGEN SATURATION: 100 % | RESPIRATION RATE: 16 BRPM | BODY MASS INDEX: 27.75 KG/M2

## 2021-07-29 DIAGNOSIS — O46.90 VAGINAL BLEEDING IN PREGNANCY: Primary | ICD-10-CM

## 2021-07-29 LAB
BASOPHILS # BLD: 0 K/UL (ref 0–0.2)
BASOPHILS NFR BLD: 0 % (ref 0–2)
DIFFERENTIAL METHOD BLD: ABNORMAL
EOSINOPHIL # BLD: 0.1 K/UL (ref 0–0.8)
EOSINOPHIL NFR BLD: 1 % (ref 0.5–7.8)
ERYTHROCYTE [DISTWIDTH] IN BLOOD BY AUTOMATED COUNT: 17.3 % (ref 11.9–14.6)
HCG SERPL-ACNC: 1461 MIU/ML (ref 0–6)
HCT VFR BLD AUTO: 35.8 % (ref 35.8–46.3)
HGB BLD-MCNC: 11.1 G/DL (ref 11.7–15.4)
IMM GRANULOCYTES # BLD AUTO: 0 K/UL (ref 0–0.5)
IMM GRANULOCYTES NFR BLD AUTO: 0 % (ref 0–5)
LYMPHOCYTES # BLD: 1.9 K/UL (ref 0.5–4.6)
LYMPHOCYTES NFR BLD: 19 % (ref 13–44)
MCH RBC QN AUTO: 21.6 PG (ref 26.1–32.9)
MCHC RBC AUTO-ENTMCNC: 31 G/DL (ref 31.4–35)
MCV RBC AUTO: 69.8 FL (ref 79.6–97.8)
MONOCYTES # BLD: 0.8 K/UL (ref 0.1–1.3)
MONOCYTES NFR BLD: 8 % (ref 4–12)
NEUTS SEG # BLD: 7.3 K/UL (ref 1.7–8.2)
NEUTS SEG NFR BLD: 72 % (ref 43–78)
NRBC # BLD: 0 K/UL (ref 0–0.2)
PLATELET # BLD AUTO: 322 K/UL (ref 150–450)
PMV BLD AUTO: 9 FL (ref 9.4–12.3)
RBC # BLD AUTO: 5.13 M/UL (ref 4.05–5.2)
WBC # BLD AUTO: 10.1 K/UL (ref 4.3–11.1)

## 2021-07-29 PROCEDURE — 93975 VASCULAR STUDY: CPT

## 2021-07-29 PROCEDURE — 99282 EMERGENCY DEPT VISIT SF MDM: CPT

## 2021-07-29 PROCEDURE — 85025 COMPLETE CBC W/AUTO DIFF WBC: CPT

## 2021-07-29 PROCEDURE — 84702 CHORIONIC GONADOTROPIN TEST: CPT

## 2021-07-29 NOTE — LETTER
[address] [address] EMERGENCY DEPT  [address] [place] [address] [phone]    Work/School Note    Date: 7/29/2021    To Whom It May concern:    Marcin Chandler was seen and treated today in the emergency room by the following provider(s):  Attending Provider: Catherine Gonsales MD  Nurse Practitioner: Fidel Demarco NP. Marcin Chandler may return to work on 07/30/2021.     Sincerely,          Hair Underwood NP

## 2021-07-29 NOTE — LETTER
[address] [address] EMERGENCY DEPT  [address] [facility] [id number] [phone]    Work/School Note    Date: 7/29/2021    To Whom It May concern:    Jennifer Smith was seen and treated today in the emergency room by the following provider(s):  Attending Provider: Lyubov Moreno MD  Nurse Practitioner: Lonni Hatchet, NP. Jennifer Smith was accompanied by Yonis Gimenez today.      Sincerely,          Alysia Falk NP

## 2021-07-29 NOTE — ED PROVIDER NOTES
43-year-old female who presents emergency department today with complaint of vaginal bleeding. Patient states that she is on Seasonique birth control. She states she has been taking this for about 1 month. She reports that she began with some light vaginal bleeding on Saturday but that today the bleeding has become much heavier and she has had some blood clots. She denies ever having any clots with her menstrual cycles. She reports some cramping in her pelvis. She states that she saw her GYN last week for routine appointment. She states she does have unprotected sex and is concerned that this could be a miscarriage. She denies any dizziness, shortness of breath, nausea, vomiting, diarrhea, or abdominal pain. She states that she placed a tampon at about 8:00 this morning and when she got to work at 9:00 and remove the tampon she had heavy flow of blood with clots. She states she has missed a dose of her birth control in the past    The history is provided by the patient. Menstrual Problem  Primary symptoms include pelvic pain, vaginal bleeding. Primary symptoms include no dysuria. There has been no fever. This is a new problem. The current episode started more than 2 days ago. The problem has been gradually worsening. Pertinent negatives include no abdominal pain, no diarrhea, no nausea, no vomiting and no fever. Sexual activity: sexually active. She uses oral contraceptives for contraception. There is no concern regarding sexually transmitted diseases. Past Medical History:   Diagnosis Date    Allergic rhinitis     Eczema     Gonorrhea 04/27/2021     ED    Marijuana abuse     Mild pre-eclampsia in third trimester 02/11/2019    Smoker        History reviewed. No pertinent surgical history.       Family History:   Problem Relation Age of Onset    Hypertension Mother     Diabetes Father     Breast Cancer Neg Hx     Colon Cancer Neg Hx     Ovarian Cancer Neg Hx     Prostate Cancer Neg Hx  Deep Vein Thrombosis Neg Hx     Pulmonary Embolism Neg Hx        Social History     Socioeconomic History    Marital status: SINGLE     Spouse name: Not on file    Number of children: Not on file    Years of education: Not on file    Highest education level: Not on file   Occupational History    Not on file   Tobacco Use    Smoking status: Light Tobacco Smoker    Smokeless tobacco: Never Used    Tobacco comment: 1/22/19 pt was smoking 6x/daily at beg of pregnancy. now down to 2. Substance and Sexual Activity    Alcohol use: Yes    Drug use: No    Sexual activity: Yes     Partners: Male     Birth control/protection: None   Other Topics Concern     Service Not Asked    Blood Transfusions Not Asked    Caffeine Concern Not Asked    Occupational Exposure Not Asked    Hobby Hazards Not Asked    Sleep Concern Not Asked    Stress Concern Not Asked    Weight Concern Not Asked    Special Diet Not Asked    Back Care Not Asked    Exercise Not Asked    Bike Helmet Not Asked   [address] Not Asked    Self-Exams Not Asked   Social History Narrative    First name pronounced: \"Ni-yah\" (pronounced  \"Eye-yah\")     Social Determinants of Health     Financial Resource Strain:     Difficulty of Paying Living Expenses:    Food Insecurity:     Worried About Running Out of Food in the Last Year:     [address] in the Last Year:    Transportation Needs:     Lack of Transportation (Medical):      Lack of Transportation (Non-Medical):    Physical Activity:     Days of Exercise per Week:     Minutes of Exercise per Session:    Stress:     Feeling of Stress :    Social Connections:     Frequency of Communication with Friends and Family:     Frequency of Social Gatherings with Friends and Family:     Attends [employer] Services:     Active Member of Clubs or Organizations:     Attends Club or Organization Meetings:     Marital Status:    Intimate Partner Violence:     Fear of Current or Ex-Partner:     Emotionally Abused:     Physically Abused:     Sexually Abused: ALLERGIES: Patient has no known allergies. Review of Systems   Constitutional: Negative for chills, fatigue and fever. Gastrointestinal: Negative for abdominal pain, diarrhea, nausea and vomiting. Genitourinary: Positive for menstrual problem, pelvic pain and vaginal bleeding. Negative for dysuria. All other systems reviewed and are negative. Vitals:    07/29/21 1000   BP: (!) 154/79   Pulse: 77   Resp: 16   Temp: 98.4 °F (36.9 °C)   SpO2: 100%   Weight: 66.7 kg (147 lb)   Height: 5' 1\" (1.549 m)            Physical Exam  Vitals and nursing note reviewed. Constitutional:       General: She is not in acute distress. Appearance: Normal appearance. She is not ill-appearing, toxic-appearing or diaphoretic. HENT:      Head: Normocephalic and atraumatic. Right Ear: External ear normal.      Left Ear: External ear normal.      Mouth/Throat:      Mouth: Mucous membranes are moist.      Pharynx: Oropharynx is clear. Eyes:      General: No scleral icterus. Extraocular Movements: Extraocular movements intact. Conjunctiva/sclera: Conjunctivae normal.   Cardiovascular:      Rate and Rhythm: Normal rate. Pulses: Normal pulses. Pulmonary:      Effort: Pulmonary effort is normal. No respiratory distress. Abdominal:      General: Abdomen is flat. There is no distension. Tenderness: There is no abdominal tenderness. Musculoskeletal:         General: Normal range of motion. Cervical back: Normal range of motion and neck supple. No rigidity. Right lower leg: No edema. Left lower leg: No edema. Skin:     General: Skin is warm and dry. Capillary Refill: Capillary refill takes less than 2 seconds. Neurological:      General: No focal deficit present. Mental Status: She is alert and oriented to person, place, and time.    Psychiatric:         Mood and Affect: Mood normal.         Behavior: Behavior normal.         Thought Content: Thought content normal.         Judgment: Judgment normal.          MDM  Number of Diagnoses or Management Options  Vaginal bleeding in pregnancy  Diagnosis management comments: 79-year-old female who presents emergency department today with complaints of vaginal bleeding. Patient states that she is on Seasonique birth control and has been for over 1 month and is not supposed to be having her cycle right now. Patient voices concern for possible miscarriage. She denies taking a pregnancy test at home. She reports the bleeding began on Saturday but worsened this morning. Her urine hCG was positive. Beta-hCG 1461. She reports that she has some mild cramping in her pelvis. She denies any abdominal pain, dysuria, nausea, vomiting, diarrhea, fever, or chills. Ultrasound Did not show an IUP or evidence of an ectopic pregnancy at this time. I have discussed the findings with the patient. I spoke with Werner Chahal, nurse at Dr. Eunice Castellon office. She states patient has only an appointment tomorrow for recheck of her hemoglobin. Patient's hemoglobin today is 11.1. Which is improved from her last check which I believe was 10. She states that patient can have outpatient hCG done on Saturday and for her to call the office on Monday for them to discuss the results. I feel patient will have good follow-up with her GYN. Patient given written order for beta-hCG to take to outpatient lab on Saturday. I have advised patient to return to the emergency department if she has any difficulty having this lab drawn on Saturday. Strict return precautions discussed with the patient. I also advised her to call her GYNs office today to set up follow-up as she will need another hCG drawn and another ultrasound.  I have discussed the results of all labs, procedures, radiographs, and/or treatments with the patient and available family members. Emmy Altamirano is agreed upon by the patient and the patient is ready for discharge.  Questions about treatment in the ED and differential diagnosis of presenting condition were answered. Kiara Haley was given verbal discharge instructions including, but not limited to, importance of returning to the emergency department for any concern of worsening or continued symptoms.  Instructions were given to follow up with a primary care provider or specialist within 1-2 days. Ama Rodriguez NP; 2021 @7:05 PM Voice dictation software was used during the making of  this note. This software is not perfect and grammatical and other typographical errors  may be present. This note has not been proofread for errors. Amount and/or Complexity of Data Reviewed  Clinical lab tests: reviewed  Tests in the radiology section of CPT®: reviewed  Discuss the patient with other providers: yes (Dr. Neelima Fields RN at Dr. Faith Sorensen office)    Risk of Complications, Morbidity, and/or Mortality  Presenting problems: moderate  Diagnostic procedures: moderate  Management options: moderate    Patient Progress  Patient progress: improved    ED Course as of 1858   Thu 2021   1015 Urine HCG positive, labs ordered. [BC]   1253 IMPRESSION     1. No visualized intrauterine gestation in the setting of a positive beta hCG. Therefore, this is a pregnancy of unknown location. Differential considerations  include an as yet nonvisualized normal intrauterine pregnancy, a missed  spontaneous , or a nonvisualized ectopic pregnancy. OB/GYN evaluation  with serial beta hCG and ultrasound are recommended.     2. Small volume simple appearing free fluid in the dependent pelvis.     3. Possible 1.6 cm right ovarian corpus luteum. Unremarkable sonographic  appearance of the left ovary. US PREG UTS LTD [BC]   1315 I spoke to Solomon Martinez RN at Dr. Faith Sorensen office. She states to have patient get outpatient Beta HCG Saturday and call the office Monday. Patient appears in no distress today and has good follow-up. Hgb stable at 11.1. Outpatient order provided for HCG. I feel patient is stable for discharge with close OB/GYN follow-up. [BC]      ED Course User Index  [BC] Brea Alcantar NP     Recent Results (from the past 12 hour(s))   CBC WITH AUTOMATED DIFF    Collection Time: 07/29/21 10:20 AM   Result Value Ref Range    WBC 10.1 4.3 - 11.1 K/uL    RBC 5.13 4.05 - 5.2 M/uL    HGB 11.1 (L) 11.7 - 15.4 g/dL    HCT 35.8 35.8 - 46.3 %    MCV 69.8 (L) 79.6 - 97.8 FL    MCH 21.6 (L) 26.1 - 32.9 PG    MCHC 31.0 (L) 31.4 - 35.0 g/dL    RDW 17.3 (H) 11.9 - 14.6 %    PLATELET 140 174 - 190 K/uL    MPV 9.0 (L) 9.4 - 12.3 FL    ABSOLUTE NRBC 0.00 0.0 - 0.2 K/uL    DF AUTOMATED      NEUTROPHILS 72 43 - 78 %    LYMPHOCYTES 19 13 - 44 %    MONOCYTES 8 4.0 - 12.0 %    EOSINOPHILS 1 0.5 - 7.8 %    BASOPHILS 0 0.0 - 2.0 %    IMMATURE GRANULOCYTES 0 0.0 - 5.0 %    ABS. NEUTROPHILS 7.3 1.7 - 8.2 K/UL    ABS. LYMPHOCYTES 1.9 0.5 - 4.6 K/UL    ABS. MONOCYTES 0.8 0.1 - 1.3 K/UL    ABS. EOSINOPHILS 0.1 0.0 - 0.8 K/UL    ABS. BASOPHILS 0.0 0.0 - 0.2 K/UL    ABS. IMM.  GRANS. 0.0 0.0 - 0.5 K/UL   BETA HCG, QT    Collection Time: 07/29/21 10:20 AM   Result Value Ref Range    Beta HCG, QT 1,461 (H) 0.0 - 6.0 MIU/ML         Procedures

## 2021-07-29 NOTE — ED NOTES
I have reviewed discharge instructions with the patient. The patient verbalized understanding. Patient left ED via Discharge Method: ambulatory to Home with friend    Opportunity for questions and clarification provided. Patient given 1 scripts. To continue your aftercare when you leave the hospital, you may receive an automated call from our care team to check in on how you are doing. This is a free service and part of our promise to provide the best care and service to meet your aftercare needs.  If you have questions, or wish to unsubscribe from this service please call [phone]. Thank you for Choosing our [facility] Emergency Department.

## 2021-07-29 NOTE — DISCHARGE INSTRUCTIONS
Go to the outpatient lab on Saturday for recheck of your Beta Hcg level. Call Dr. Rossi Pap office on Monday morning and they will discuss result with you and schedule follow-up appointment. Return to the ER for any new, worsening, or concerning symptoms.

## 2021-07-31 ENCOUNTER — HOSPITAL ENCOUNTER (OUTPATIENT)
Dept: LAB | Age: 21
Discharge: HOME | End: 2021-07-31
Payer: MEDICAID

## 2021-07-31 LAB — HCG SERPL-ACNC: 182 MIU/ML (ref 0–6)

## 2021-07-31 PROCEDURE — 84702 CHORIONIC GONADOTROPIN TEST: CPT

## 2021-07-31 PROCEDURE — 36415 COLL VENOUS BLD VENIPUNCTURE: CPT

## 2021-08-01 ENCOUNTER — HOSPITAL ENCOUNTER (EMERGENCY)
Age: 21
Discharge: HOME | End: 2021-08-01
Attending: EMERGENCY MEDICINE
Payer: MEDICAID

## 2021-08-01 VITALS
WEIGHT: 147 LBS | BODY MASS INDEX: 27.75 KG/M2 | DIASTOLIC BLOOD PRESSURE: 66 MMHG | RESPIRATION RATE: 16 BRPM | HEART RATE: 95 BPM | OXYGEN SATURATION: 98 % | HEIGHT: 61 IN | SYSTOLIC BLOOD PRESSURE: 117 MMHG | TEMPERATURE: 98.8 F

## 2021-08-01 DIAGNOSIS — O03.9 INEVITABLE COMPLETE MISCARRIAGE WITHOUT COMPLICATION: Primary | ICD-10-CM

## 2021-08-01 PROCEDURE — 96372 THER/PROPH/DIAG INJ SC/IM: CPT

## 2021-08-01 PROCEDURE — 99282 EMERGENCY DEPT VISIT SF MDM: CPT

## 2021-08-01 PROCEDURE — 74011250636 HC RX REV CODE- 250/636: Performed by: EMERGENCY MEDICINE

## 2021-08-01 RX ORDER — HYDROCODONE BITARTRATE AND ACETAMINOPHEN 5; 325 MG/1; MG/1
1 TABLET ORAL
Qty: 3 TABLET | Refills: 0 | Status: SHIPPED | OUTPATIENT
Start: 2021-08-01 | End: 2021-08-04

## 2021-08-01 RX ORDER — KETOROLAC TROMETHAMINE 30 MG/ML
30 INJECTION, SOLUTION INTRAMUSCULAR; INTRAVENOUS
Status: COMPLETED | OUTPATIENT
Start: 2021-08-01 | End: 2021-08-01

## 2021-08-01 RX ADMIN — KETOROLAC TROMETHAMINE 30 MG: 30 INJECTION, SOLUTION INTRAMUSCULAR; INTRAVENOUS at 13:18

## 2021-08-01 NOTE — ED NOTES
I have reviewed discharge instructions with the patient. The patient verbalized understanding. Patient left ED via Discharge Method: ambulatory to Home with friend. Opportunity for questions and clarification provided. Patient given 1 scripts. To continue your aftercare when you leave the hospital, you may receive an automated call from our care team to check in on how you are doing. This is a free service and part of our promise to provide the best care and service to meet your aftercare needs.  If you have questions, or wish to unsubscribe from this service please call [phone]. Thank you for Choosing our [facility] Emergency Department.

## 2021-08-01 NOTE — DISCHARGE INSTRUCTIONS
Return for worsening or concerning symptoms such as bleeding through more than 1 pad an hour, passing out, shortness of breath, severe pain.

## 2021-08-01 NOTE — ED PROVIDER NOTES
24year-old G2, P1 presents with persistent pelvic and back pain after miscarriage. She was seen in emergency department 3 days ago and diagnosed with miscarriage. Followed up with lab yesterday and confirmed that her hCG was decreasing from 1461 to 182. Pelvic ultrasound did not show an IUP. She continues to have bleeding, but has only been having to change her pad about every 5 hours. She has occasional clots and tissue. Denies nausea, vomiting, syncope. She does report some generalized weakness. She states Tylenol Motrin are not helping for the pain. Miscarriage   Associated symptoms include back pain. Pertinent negatives include no fever, no diarrhea, no nausea, no vomiting, no headaches and no chest pain. Past Medical History:   Diagnosis Date    Allergic rhinitis     Eczema     Gonorrhea 04/27/2021     ED    Marijuana abuse     Mild pre-eclampsia in third trimester 02/11/2019    Smoker        History reviewed. No pertinent surgical history. Family History:   Problem Relation Age of Onset    Hypertension Mother     Diabetes Father     Breast Cancer Neg Hx     Colon Cancer Neg Hx     Ovarian Cancer Neg Hx     Prostate Cancer Neg Hx     Deep Vein Thrombosis Neg Hx     Pulmonary Embolism Neg Hx        Social History     Socioeconomic History    Marital status: SINGLE     Spouse name: Not on file    Number of children: Not on file    Years of education: Not on file    Highest education level: Not on file   Occupational History    Not on file   Tobacco Use    Smoking status: Light Tobacco Smoker    Smokeless tobacco: Never Used    Tobacco comment: 1/22/19 pt was smoking 6x/daily at beg of pregnancy. now down to 2. Substance and Sexual Activity    Alcohol use:  Yes    Drug use: No    Sexual activity: Yes     Partners: Male     Birth control/protection: None   Other Topics Concern     Service Not Asked    Blood Transfusions Not Asked    Caffeine Concern Not Asked    Occupational Exposure Not Asked   [place] Hazards Not Asked    Sleep Concern Not Asked    Stress Concern Not Asked    Weight Concern Not Asked    Special Diet Not Asked    Back Care Not Asked    Exercise Not Asked    Bike Helmet Not Asked   [address] Not Asked    Self-Exams Not Asked   Social History Narrative    First name pronounced: \"Ni-yah\" (pronounced  \"Eye-yah\")     Social Determinants of Health     Financial Resource Strain:     Difficulty of Paying Living Expenses:    Food Insecurity:     Worried About Running Out of Food in the Last Year:     [address] in the Last Year:    Transportation Needs:     Lack of Transportation (Medical):  Lack of Transportation (Non-Medical):    Physical Activity:     Days of Exercise per Week:     Minutes of Exercise per Session:    Stress:     Feeling of Stress :    Social Connections:     Frequency of Communication with Friends and Family:     Frequency of Social Gatherings with Friends and Family:     Attends [employer] Services:     Active Member of Clubs or Organizations:     Attends Club or Organization Meetings:     Marital Status:    Intimate Partner Violence:     Fear of Current or Ex-Partner:     Emotionally Abused:     Physically Abused:     Sexually Abused: ALLERGIES: Patient has no known allergies. Review of Systems   Constitutional: Negative for fever. HENT: Negative for hearing loss. Eyes: Negative for visual disturbance. Respiratory: Negative for cough and shortness of breath. Cardiovascular: Negative for chest pain. Gastrointestinal: Positive for abdominal pain. Negative for diarrhea, nausea and vomiting. Genitourinary: Positive for pelvic pain and vaginal bleeding. Musculoskeletal: Positive for back pain. Skin: Negative for rash. Neurological: Negative for headaches. Psychiatric/Behavioral: Negative for confusion. All other systems reviewed and are negative.       Vitals: 08/01/21 1218   BP: 117/66   Pulse: 95   Resp: 16   Temp: 98.8 °F (37.1 °C)   SpO2: 98%   Weight: 66.7 kg (147 lb)   Height: 5' 1\" (1.549 m)            Physical Exam  Vitals and nursing note reviewed. Constitutional:       Appearance: Normal appearance. She is well-developed. HENT:      Head: Normocephalic and atraumatic. Nose: Nose normal.      Mouth/Throat:      Mouth: Mucous membranes are moist.   Eyes:      Pupils: Pupils are equal, round, and reactive to light. Cardiovascular:      Rate and Rhythm: Regular rhythm. Heart sounds: Normal heart sounds. Pulmonary:      Effort: Pulmonary effort is normal.      Breath sounds: Normal breath sounds. Abdominal:      Palpations: Abdomen is soft. Tenderness: There is abdominal tenderness in the right lower quadrant, suprapubic area and left lower quadrant. Musculoskeletal:         General: No deformity. Normal range of motion. Cervical back: Normal range of motion and neck supple. Skin:     General: Skin is warm and dry. Neurological:      General: No focal deficit present. Mental Status: She is alert. Mental status is at baseline. Psychiatric:         Mood and Affect: Mood normal.         Behavior: Behavior normal.          MDM  Number of Diagnoses or Management Options  Inevitable complete miscarriage without complication  Diagnosis management comments: Parts of this document were created using dragon voice recognition software. The chart has been reviewed but errors may still be present. I wore appropriate PPE throughout this patient's ED visit. Natalie Mcdaniel MD, 1:37 PM    Pain treated. Well appearing     I discussed the results of all labs, procedures, radiographs, and treatments with the patient and available family. Treatment plan is agreed upon and the patient is ready for discharge. Questions about treatment in the ED and differential diagnosis of presenting condition were answered.   Patient was given verbal discharge instructions including, but not limited to, importance of returning to the emergency department for any concern of worsening or continued symptoms. Instructions were given to follow up with a primary care provider or specialist within 1-2 days. Adverse effects of medications, if prescribed, were discussed and patient was advised to refrain from significant physical activity until followed up by primary care physician and to not drive or operate heavy machinery after taking any sedating substances.            Amount and/or Complexity of Data Reviewed  Tests in the medicine section of CPT®: ordered and reviewed           Procedures

## 2021-08-01 NOTE — ED TRIAGE NOTES
Pt states she had a miscarriage on Thursday and has been having severe cramping and vaginal bleeding since then. States she came here for evaluation Thursday but was not given any prescription for pain medication and OTC medications are not helping with the pain. Masked for triage.

## 2021-09-29 ENCOUNTER — HOSPITAL ENCOUNTER (EMERGENCY)
Age: 21
Discharge: HOME | End: 2021-09-29
Attending: EMERGENCY MEDICINE
Payer: MEDICAID

## 2021-09-29 ENCOUNTER — APPOINTMENT (OUTPATIENT)
Dept: ULTRASOUND IMAGING | Age: 21
End: 2021-09-29
Attending: EMERGENCY MEDICINE
Payer: MEDICAID

## 2021-09-29 VITALS
SYSTOLIC BLOOD PRESSURE: 145 MMHG | HEIGHT: 61 IN | HEART RATE: 114 BPM | DIASTOLIC BLOOD PRESSURE: 68 MMHG | TEMPERATURE: 98.6 F | BODY MASS INDEX: 28.32 KG/M2 | WEIGHT: 150 LBS | RESPIRATION RATE: 18 BRPM | OXYGEN SATURATION: 100 %

## 2021-09-29 DIAGNOSIS — O03.9 COMPLETE MISCARRIAGE: Primary | ICD-10-CM

## 2021-09-29 LAB
BACTERIA URNS QL MICRO: 0 /HPF
BASOPHILS # BLD: 0 K/UL (ref 0–0.2)
BASOPHILS NFR BLD: 0 % (ref 0–2)
CASTS URNS QL MICRO: NORMAL /LPF
DIFFERENTIAL METHOD BLD: ABNORMAL
EOSINOPHIL # BLD: 0.1 K/UL (ref 0–0.8)
EOSINOPHIL NFR BLD: 1 % (ref 0.5–7.8)
EPI CELLS #/AREA URNS HPF: NORMAL /HPF
ERYTHROCYTE [DISTWIDTH] IN BLOOD BY AUTOMATED COUNT: 19.7 % (ref 11.9–14.6)
HCG SERPL-ACNC: 3648 MIU/ML (ref 0–6)
HCG UR QL: POSITIVE
HCT VFR BLD AUTO: 36 % (ref 35.8–46.3)
HGB BLD-MCNC: 11.2 G/DL (ref 11.7–15.4)
IMM GRANULOCYTES # BLD AUTO: 0 K/UL (ref 0–0.5)
IMM GRANULOCYTES NFR BLD AUTO: 0 % (ref 0–5)
LYMPHOCYTES # BLD: 1.5 K/UL (ref 0.5–4.6)
LYMPHOCYTES NFR BLD: 24 % (ref 13–44)
MCH RBC QN AUTO: 22 PG (ref 26.1–32.9)
MCHC RBC AUTO-ENTMCNC: 31.1 G/DL (ref 31.4–35)
MCV RBC AUTO: 70.6 FL (ref 79.6–97.8)
MONOCYTES # BLD: 0.5 K/UL (ref 0.1–1.3)
MONOCYTES NFR BLD: 7 % (ref 4–12)
NEUTS SEG # BLD: 4.3 K/UL (ref 1.7–8.2)
NEUTS SEG NFR BLD: 67 % (ref 43–78)
NRBC # BLD: 0 K/UL (ref 0–0.2)
PLATELET # BLD AUTO: 316 K/UL (ref 150–450)
PMV BLD AUTO: 9.5 FL (ref 9.4–12.3)
RBC # BLD AUTO: 5.1 M/UL (ref 4.05–5.2)
RBC #/AREA URNS HPF: NORMAL /HPF
WBC # BLD AUTO: 6.3 K/UL (ref 4.3–11.1)
WBC URNS QL MICRO: NORMAL /HPF

## 2021-09-29 PROCEDURE — 76817 TRANSVAGINAL US OBSTETRIC: CPT

## 2021-09-29 PROCEDURE — 81003 URINALYSIS AUTO W/O SCOPE: CPT

## 2021-09-29 PROCEDURE — 99284 EMERGENCY DEPT VISIT MOD MDM: CPT

## 2021-09-29 PROCEDURE — 84702 CHORIONIC GONADOTROPIN TEST: CPT

## 2021-09-29 PROCEDURE — 85025 COMPLETE CBC W/AUTO DIFF WBC: CPT

## 2021-09-29 PROCEDURE — 81025 URINE PREGNANCY TEST: CPT

## 2021-09-29 PROCEDURE — 81015 MICROSCOPIC EXAM OF URINE: CPT

## 2021-09-29 RX ORDER — HYDROCODONE BITARTRATE AND ACETAMINOPHEN 5; 325 MG/1; MG/1
1 TABLET ORAL
Qty: 6 TABLET | Refills: 0 | Status: SHIPPED | OUTPATIENT
Start: 2021-09-29 | End: 2021-10-02

## 2021-09-29 NOTE — ED PROVIDER NOTES
Patient is approximately 6 weeks pregnant G3 A1 P1. Saturday had some mild bleeding. Sunday had mild lower abdominal cramping. Had clots Sunday Monday and Tuesday. Pain became worse today with small clots so came in. No dysuria. No nausea or vomiting. The history is provided by the patient. No  was used. Vaginal Bleeding  This is a new problem. The current episode started more than 2 days ago. The problem occurs constantly. The problem has not changed since onset. Associated symptoms include abdominal pain. Pertinent negatives include no chest pain, no headaches and no shortness of breath. Nothing aggravates the symptoms. Nothing relieves the symptoms. She has tried nothing for the symptoms. Past Medical History:   Diagnosis Date    Allergic rhinitis     Eczema     Gonorrhea 04/27/2021     ED    Marijuana abuse     Mild pre-eclampsia in third trimester 02/11/2019    Smoker        No past surgical history on file. Family History:   Problem Relation Age of Onset    Hypertension Mother     Diabetes Father     Breast Cancer Neg Hx     Colon Cancer Neg Hx     Ovarian Cancer Neg Hx     Prostate Cancer Neg Hx     Deep Vein Thrombosis Neg Hx     Pulmonary Embolism Neg Hx        Social History     Socioeconomic History    Marital status: SINGLE     Spouse name: Not on file    Number of children: Not on file    Years of education: Not on file    Highest education level: Not on file   Occupational History    Not on file   Tobacco Use    Smoking status: Light Tobacco Smoker    Smokeless tobacco: Never Used    Tobacco comment: 1/22/19 pt was smoking 6x/daily at beg of pregnancy. now down to 2. Substance and Sexual Activity    Alcohol use:  Yes    Drug use: No    Sexual activity: Yes     Partners: Male     Birth control/protection: None   Other Topics Concern     Service Not Asked    Blood Transfusions Not Asked    Caffeine Concern Not Asked    Occupational Exposure Not Asked   Midge Gip Hazards Not Asked    Sleep Concern Not Asked    Stress Concern Not Asked    Weight Concern Not Asked    Special Diet Not Asked    Back Care Not Asked    Exercise Not Asked    Bike Helmet Not Asked   [address] Not Asked    Self-Exams Not Asked   Social History Narrative    First name pronounced: \"Ni-yah\" (pronounced  \"Eye-yah\")     Social Determinants of Health     Financial Resource Strain:     Difficulty of Paying Living Expenses:    Food Insecurity:     Worried About Running Out of Food in the Last Year:     [address] in the Last Year:    Transportation Needs:     Lack of Transportation (Medical):  Lack of Transportation (Non-Medical):    Physical Activity:     Days of Exercise per Week:     Minutes of Exercise per Session:    Stress:     Feeling of Stress :    Social Connections:     Frequency of Communication with Friends and Family:     Frequency of Social Gatherings with Friends and Family:     Attends Druze Services:     Active Member of Clubs or Organizations:     Attends Club or Organization Meetings:     Marital Status:    Intimate Partner Violence:     Fear of Current or Ex-Partner:     Emotionally Abused:     Physically Abused:     Sexually Abused: ALLERGIES: Patient has no known allergies. Review of Systems   Constitutional: Negative for chills and fever. Eyes: Negative for pain and redness. Respiratory: Negative for chest tightness, shortness of breath and wheezing. Cardiovascular: Negative for chest pain and leg swelling. Gastrointestinal: Positive for abdominal pain. Negative for diarrhea, nausea and vomiting. Genitourinary: Positive for vaginal bleeding. Negative for dysuria, hematuria and vaginal discharge. Musculoskeletal: Negative for back pain, gait problem, neck pain and neck stiffness. Skin: Negative for color change and rash.    Neurological: Negative for weakness, numbness and headaches. All other systems reviewed and are negative. Vitals:    09/29/21 1345   BP: 127/77   Pulse: (!) 114   Resp: 18   Temp: 98.6 °F (37 °C)   SpO2: 100%   Weight: 68 kg (150 lb)   Height: 5' 1\" (1.549 m)            Physical Exam  Constitutional:       Appearance: Normal appearance. She is well-developed. HENT:      Head: Normocephalic and atraumatic. Cardiovascular:      Rate and Rhythm: Normal rate and regular rhythm. Pulmonary:      Effort: Pulmonary effort is normal.      Breath sounds: Normal breath sounds. Abdominal:      General: Bowel sounds are normal.      Palpations: Abdomen is soft. Tenderness: There is abdominal tenderness (mild lower abd). Musculoskeletal:         General: Normal range of motion. Cervical back: Normal range of motion and neck supple. Skin:     General: Skin is warm and dry. Neurological:      Mental Status: She is alert and oriented to person, place, and time. MDM  Number of Diagnoses or Management Options  Diagnosis management comments: Patient is B positive. Had clots and cramping. US now shows no IUP and thickened endometrial stripe. likely miscarriage. Will have follow up with OB for repeat BHCG and US. Or return here if getting worse.         Amount and/or Complexity of Data Reviewed  Clinical lab tests: ordered and reviewed  Tests in the radiology section of CPT®: ordered and reviewed    Patient Progress  Patient progress: stable         Procedures          Results Include:    Recent Results (from the past 24 hour(s))   HCG URINE, QL. - POC    Collection Time: 09/29/21  1:51 PM   Result Value Ref Range    Pregnancy test,urine (POC) Positive (A) NEG     URINE MICROSCOPIC    Collection Time: 09/29/21  1:52 PM   Result Value Ref Range    WBC 10-20 0 /hpf    RBC 20-50 0 /hpf    Epithelial cells 0-3 0 /hpf    Bacteria 0 0 /hpf    Casts 0-3 0 /lpf   CBC WITH AUTOMATED DIFF    Collection Time: 09/29/21  1:56 PM   Result Value Ref Range    WBC 6.3 4.3 - 11.1 K/uL    RBC 5.10 4.05 - 5.2 M/uL    HGB 11.2 (L) 11.7 - 15.4 g/dL    HCT 36.0 35.8 - 46.3 %    MCV 70.6 (L) 79.6 - 97.8 FL    MCH 22.0 (L) 26.1 - 32.9 PG    MCHC 31.1 (L) 31.4 - 35.0 g/dL    RDW 19.7 (H) 11.9 - 14.6 %    PLATELET 100 359 - 614 K/uL    MPV 9.5 9.4 - 12.3 FL    ABSOLUTE NRBC 0.00 0.0 - 0.2 K/uL    DF AUTOMATED      NEUTROPHILS 67 43 - 78 %    LYMPHOCYTES 24 13 - 44 %    MONOCYTES 7 4.0 - 12.0 %    EOSINOPHILS 1 0.5 - 7.8 %    BASOPHILS 0 0.0 - 2.0 %    IMMATURE GRANULOCYTES 0 0.0 - 5.0 %    ABS. NEUTROPHILS 4.3 1.7 - 8.2 K/UL    ABS. LYMPHOCYTES 1.5 0.5 - 4.6 K/UL    ABS. MONOCYTES 0.5 0.1 - 1.3 K/UL    ABS. EOSINOPHILS 0.1 0.0 - 0.8 K/UL    ABS. BASOPHILS 0.0 0.0 - 0.2 K/UL    ABS. IMM. GRANS. 0.0 0.0 - 0.5 K/UL   BETA HCG, QT    Collection Time: 09/29/21  1:56 PM   Result Value Ref Range    Beta HCG, QT 3,648 (H) 0.0 - 6.0 MIU/ML     [facility] (Final result)  Result time 09/29/21 16:38:20  Final result by Stu Scott MD (09/29/21 16:38:20)                Impression:    No intrauterine pregnancy or extra-axial ovarian adnexal mass. Narrative:    PELVIC ULTRASOUND. INDICATION:  Vaginal bleeding and positive pregnancy test. hCG 3,648. TECHNIQUE:  Transabdominal sector images through a urine distended bladder and   high resolution endovaginal 8MHz images of the pelvis. Color flow and Doppler of   the ovaries performed. FINDINGS:  The uterus measures 7.9 cm x 5.7 cm x 5.6 cm.  The endometrial echo   stripe is uniform in thickened, 23 mm. No intrauterine gestational sac. .     Right ovary:  2.7 x 2.1 x 1.9 cm. Mildly complex 12 mm old cyst, possibly corpus   luteum.      Left ovary:  3.6 x 2.6 x 2.1 cm.

## 2021-09-29 NOTE — ED TRIAGE NOTES
Pt states that she is having a miscarriage. Pt's pain is in her lower abdomen. The pain began Sunday and she began having vaginal bleeding on Saturday. Pt states that she had a positive pregnancy test on Friday.

## 2021-09-29 NOTE — ED NOTES
I have reviewed discharge instructions with the patient. The patient verbalized understanding. Patient left ED via Discharge Method: ambulatory to Home with (self). Opportunity for questions and clarification provided. Patient given 1 scripts. To continue your aftercare when you leave the hospital, you may receive an automated call from our care team to check in on how you are doing. This is a free service and part of our promise to provide the best care and service to meet your aftercare needs.  If you have questions, or wish to unsubscribe from this service please call [phone]. Thank you for Choosing our [facility] Emergency Department.

## 2022-03-18 PROBLEM — O60.00 PRETERM LABOR: Status: ACTIVE | Noted: 2019-02-07

## 2022-03-18 PROBLEM — O36.5990 PREGNANCY AFFECTED BY FETAL GROWTH RESTRICTION: Status: ACTIVE | Noted: 2019-02-08

## 2022-03-19 PROBLEM — O14.03 MILD PRE-ECLAMPSIA IN THIRD TRIMESTER: Status: ACTIVE | Noted: 2019-02-11

## 2022-03-19 PROBLEM — Z72.0 TOBACCO ABUSE: Status: ACTIVE | Noted: 2018-10-18

## 2022-03-19 PROBLEM — Z37.9 NORMAL LABOR: Status: ACTIVE | Noted: 2019-02-11

## 2022-03-19 PROBLEM — F12.90 MARIJUANA USE: Status: ACTIVE | Noted: 2019-02-05

## 2022-03-19 PROBLEM — Z34.03 PRIMIGRAVIDA IN THIRD TRIMESTER: Status: ACTIVE | Noted: 2019-01-29

## 2022-03-20 PROBLEM — F17.200 SMOKER: Status: ACTIVE | Noted: 2019-02-11

## 2022-03-20 PROBLEM — Z71.89 ENCOUNTER FOR BREAST FEEDING COUNSELING: Status: ACTIVE | Noted: 2018-10-18

## 2022-03-20 PROBLEM — F41.9 ANXIETY: Status: ACTIVE | Noted: 2018-10-18

## 2022-06-12 ENCOUNTER — HOSPITAL ENCOUNTER (EMERGENCY)
Age: 22
Discharge: HOME | End: 2022-06-12
Attending: EMERGENCY MEDICINE
Payer: MEDICAID

## 2022-06-12 VITALS
WEIGHT: 135 LBS | OXYGEN SATURATION: 100 % | BODY MASS INDEX: 25.49 KG/M2 | HEART RATE: 90 BPM | HEIGHT: 61 IN | TEMPERATURE: 99.3 F | SYSTOLIC BLOOD PRESSURE: 145 MMHG | DIASTOLIC BLOOD PRESSURE: 85 MMHG | RESPIRATION RATE: 18 BRPM

## 2022-06-12 DIAGNOSIS — U07.1 COVID-19: Primary | ICD-10-CM

## 2022-06-12 LAB
SARS-COV-2 RDRP RESP QL NAA+PROBE: DETECTED
SOURCE: ABNORMAL

## 2022-06-12 PROCEDURE — 99283 EMERGENCY DEPT VISIT LOW MDM: CPT

## 2022-06-12 PROCEDURE — 87635 SARS-COV-2 COVID-19 AMP PRB: CPT

## 2022-06-12 RX ORDER — MELOXICAM 15 MG/1
15 TABLET ORAL DAILY
Qty: 10 TABLET | Refills: 0 | Status: SHIPPED | OUTPATIENT
Start: 2022-06-12 | End: 2022-07-12

## 2022-06-12 ASSESSMENT — ENCOUNTER SYMPTOMS
SINUS PRESSURE: 0
NAUSEA: 0
ABDOMINAL DISTENTION: 0
COLOR CHANGE: 0
COUGH: 0
ABDOMINAL PAIN: 0
SORE THROAT: 0
DIARRHEA: 0
SHORTNESS OF BREATH: 0
CONSTIPATION: 0
CHEST TIGHTNESS: 0
VOMITING: 0
VOICE CHANGE: 0

## 2022-06-12 ASSESSMENT — PAIN SCALES - GENERAL: PAINLEVEL_OUTOF10: 8

## 2022-06-12 ASSESSMENT — PAIN - FUNCTIONAL ASSESSMENT: PAIN_FUNCTIONAL_ASSESSMENT: 0-10

## 2022-06-12 ASSESSMENT — PAIN DESCRIPTION - LOCATION: LOCATION: GENERALIZED

## 2022-06-12 NOTE — Clinical Note
Sarmad Sanders was seen and treated in our emergency department on 6/12/2022. She may return to work on 06/17/2022. If you have any questions or concerns, please don't hesitate to call.       Rigo Stephens MD

## 2022-06-12 NOTE — Clinical Note
Sherri Narvaez was seen and treated in our emergency department on 6/12/2022. She may return to work on 06/17/2022. If you have any questions or concerns, please don't hesitate to call.       Adelina Ghosh MD

## 2022-06-12 NOTE — ED NOTES
I have reviewed discharge instructions with the patient. The patient verbalized understanding. Patient left ED via Discharge Method: ambulatory to Home with mom    Opportunity for questions and clarification provided. Patient given 1 scripts. To continue your aftercare when you leave the hospital, you may receive an automated call from our care team to check in on how you are doing. This is a free service and part of our promise to provide the best care and service to meet your aftercare needs.  If you have questions, or wish to unsubscribe from this service please call [phone]. Thank you for Choosing our [facility] Emergency Department.         Carey Carr RN  06/12/22 [id number]

## 2022-06-12 NOTE — ED PROVIDER NOTES
[employer] Emergency Department Provider Note                   PCP:                None Provider               Age: 20 y.o. Sex: female       ICD-10-CM    1. COVID-19  U5.3        DISPOSITION Decision To Discharge 06/12/2022 12:13:58 PM       Discharge Medication List as of 6/12/2022 12:32 PM      START taking these medications    Details   meloxicam (MOBIC) 15 MG tablet Take 1 tablet by mouth daily, Disp-10 tablet, R-0Print             Orders Placed This Encounter   Procedures    COVID-19, Félix Samuels MD 4:21 PM      MDM  Number of Diagnoses or Management Options  COVID-19  Diagnosis management comments: Patient COVID-positive lungs are clear. Bhupinder Nieves MD; 6/12/2022 @4:21 PM Voice dictation software was used during the making of this note. This software is not perfect and grammatical and other typographical errors may be present. This note has not been proofread for errors.  ====================================        Amount and/or Complexity of Data Reviewed  Clinical lab tests: ordered and reviewed (Results for orders placed or performed during the hospital encounter of 06/12/22  -COVID-19, Rapid:   Specimen: Nasopharyngeal       Result                                            Value                         Ref Range                       Source                                            NASAL                                                         SARS-CoV-2, Rapid                                 Detected (AA)                 NOTD                      )         Osiel Bustamante is a 20 y.o. female who presents to the Emergency Department with chief complaint of    Chief Complaint   Patient presents with    Concern For COVID-19      Patient has cough and congestion for the last couple days. She reports she did have COVID last year. There was no fever or chills no other abnormalities. Patient has body aches, fatigue, and sorethroat.   Patient did have 2 positive covid tests at home.  She has not been vaccinated. Is sore throat but no trouble breathing. The history is provided by the patient. All other systems reviewed and are negative. Review of Systems   Constitutional: Negative for activity change, chills and fever. HENT: Negative for congestion, sinus pressure, sneezing, sore throat, tinnitus and voice change. Respiratory: Negative for cough, chest tightness and shortness of breath. Cardiovascular: Negative for chest pain and palpitations. Gastrointestinal: Negative for abdominal distention, abdominal pain, constipation, diarrhea, nausea and vomiting. Musculoskeletal: Negative for neck pain and neck stiffness. Skin: Negative for color change, pallor, rash and wound. Neurological: Negative for weakness and numbness. All other systems reviewed and are negative. Past Medical History:   Diagnosis Date    Allergic rhinitis     Eczema     Gonorrhea 04/27/2021     ED    Marijuana abuse     Mild pre-eclampsia in third trimester 02/11/2019    Smoker     Trichomonal vaginitis 10/2021        History reviewed. No pertinent surgical history. Family History   Problem Relation Age of Onset    Diabetes Father     Hypertension Mother     Pulmonary Embolism Neg Hx     Deep Vein Thrombosis Neg Hx     Prostate Cancer Neg Hx     Ovarian Cancer Neg Hx     Breast Cancer Neg Hx     Colon Cancer Neg Hx            Social Connections:     Frequency of Communication with Friends and Family: Not on file    Frequency of Social Gatherings with Friends and Family: Not on file    Attends Pentecostalism Services: Not on file    Active Member of Clubs or Organizations: Not on file    Attends Club or Organization Meetings: Not on file    Marital Status: Not on file        No Known Allergies     Vitals signs and nursing note reviewed. Physical Exam  Vitals and nursing note reviewed. Constitutional:       General: She is not in acute distress. Appearance: Normal appearance. She is not ill-appearing, toxic-appearing or diaphoretic. HENT:      Head: Normocephalic and atraumatic. Eyes:      General: No scleral icterus. Conjunctiva/sclera: Conjunctivae normal.   Cardiovascular:      Rate and Rhythm: Normal rate and regular rhythm. Pulses: Normal pulses. Pulmonary:      Effort: Pulmonary effort is normal. No respiratory distress. Breath sounds: Normal breath sounds. No stridor. No wheezing, rhonchi or rales. Chest:      Chest wall: No tenderness. Abdominal:      General: Abdomen is flat. Palpations: Abdomen is soft. Tenderness: There is no abdominal tenderness. There is no guarding or rebound. Hernia: No hernia is present. Musculoskeletal:      Cervical back: Normal range of motion and neck supple. Skin:     Capillary Refill: Capillary refill takes less than 2 seconds. Neurological:      General: No focal deficit present. Mental Status: She is alert and oriented to person, place, and time. Mental status is at baseline. Psychiatric:         Mood and Affect: Mood normal.         Behavior: Behavior normal.          Procedures        Labs Reviewed   COVID-19, RAPID - Abnormal; Notable for the following components:       Result Value    SARS-CoV-2, Rapid Detected (*)     All other components within normal limits        No orders to display                            Voice dictation software was used during the making of this note. This software is not perfect and grammatical and other typographical errors may be present. This note has not been completely proofread for errors.       Ruby Alberto MD  06/12/22 0000

## 2022-06-12 NOTE — ED TRIAGE NOTES
Patient with generalized body aches, fatigue, sore throat. Patient states 2 positive at home covid tests.  Patient looking for covid test.

## 2022-06-12 NOTE — Clinical Note
Bruno Cazares was seen and treated in our emergency department on 6/12/2022. She may return to work on 06/17/2022. If you have any questions or concerns, please don't hesitate to call.       Alejandro Ordaz MD

## 2022-06-13 ENCOUNTER — CARE COORDINATION (OUTPATIENT)
Dept: CARE COORDINATION | Facility: CLINIC | Age: 22
End: 2022-06-13

## 2022-06-13 NOTE — CARE COORDINATION
Patient contacted regarding COVID-19 diagnosis. Discussed COVID-19 related testing which was available at this time. Test results were positive. Patient informed of results, if available? Yes. LPN Care Coordinator contacted the patient by telephone to perform post discharge assessment. Call within 2 business days of discharge: Yes. Verified name and  with patient as identifiers. Provided introduction to self, and explanation of the LPN CC role, and reason for call due to risk factors for infection and/or exposure to COVID-19. Symptoms reviewed with patient who verbalized the following symptoms: cough  sweating  no new symptoms  no worsening symptoms. Due to no new or worsening symptoms encounter was not routed to provider for escalation. Discussed follow-up appointments. If no appointment was previously scheduled, appointment scheduling offered: Yes. [facility] follow up appointment(s):   Future Appointments   Date Time Provider Luis A Erwin   2022  4:00 PM Cindy Maldonado MD [facility] AMB   2022  9:00 AM Cindy Maldonado MD [facility] AMB         Non-face-to-face services provided:  Scheduled appointment with PCP-Patient declined assistance with scheduling   Obtained and reviewed discharge summary and/or continuity of care documents  Education of patient/family/caregiver/guardian to support self-management-Patient verbalized understanding      Advance Care Planning:   Does patient have an Advance Directive:  not on file. Educated patient about risk for severe COVID-19 due to risk factors according to CDC guidelines. LPN CC reviewed discharge instructions, medical action plan and red flag symptoms with the patient who verbalized understanding. Discussed COVID vaccination status: Yes. Education provided on COVID-19 vaccination as appropriate. Discussed exposure protocols and quarantine with CDC Guidelines.  Patient was given an opportunity to verbalize any questions and concerns and agrees to contact LPN CC or health care provider for questions related to their healthcare. Reviewed and educated patient on any new and changed medications related to discharge diagnosis     Was patient discharged with a pulse oximeter? No     LPN CC provided contact information. Plan for follow-up call in 5-7 days based on severity of symptoms and risk factors.

## 2022-06-20 ENCOUNTER — CARE COORDINATION (OUTPATIENT)
Dept: CARE COORDINATION | Facility: CLINIC | Age: 22
End: 2022-06-20

## 2022-06-20 NOTE — CARE COORDINATION
You Patient resolved from the Care Transitions episode on 6/20/22  Discussed COVID-19 related testing which was available at this time. Test results were positive. Patient informed of results, if available? Yes    Patient/family has been provided the following resources and education related to COVID-19:                         Signs, symptoms and red flags related to COVID-19            CDC exposure and quarantine guidelines            [facility] exposure contact - [phone]            Contact for their local Department of Health                 Patient currently reports that the following symptoms have improved:  cough, sweating and no new/worsening symptoms     No further outreach scheduled with this LPN CC. Episode of Care resolved. Patient has this LPN CC contact information if future needs arise.

## 2022-07-12 ENCOUNTER — OFFICE VISIT (OUTPATIENT)
Dept: OBGYN CLINIC | Age: 22
End: 2022-07-12
Payer: MEDICAID

## 2022-07-12 VITALS
WEIGHT: 139 LBS | DIASTOLIC BLOOD PRESSURE: 74 MMHG | SYSTOLIC BLOOD PRESSURE: 112 MMHG | HEIGHT: 61 IN | BODY MASS INDEX: 26.24 KG/M2

## 2022-07-12 DIAGNOSIS — Z11.3 SCREENING FOR STD (SEXUALLY TRANSMITTED DISEASE): Primary | ICD-10-CM

## 2022-07-12 DIAGNOSIS — Z30.016 ENCOUNTER FOR INITIAL PRESCRIPTION OF TRANSDERMAL PATCH HORMONAL CONTRACEPTIVE DEVICE: ICD-10-CM

## 2022-07-12 DIAGNOSIS — Z32.00 UNCONFIRMED PREGNANCY: ICD-10-CM

## 2022-07-12 PROCEDURE — 99214 OFFICE O/P EST MOD 30 MIN: CPT | Performed by: OBSTETRICS & GYNECOLOGY

## 2022-07-12 RX ORDER — NORELGESTROMIN AND ETHINYL ESTRADIOL 150; 35 UG/D; UG/D
PATCH TRANSDERMAL
Qty: 3 PATCH | Refills: 11 | Status: SHIPPED | OUTPATIENT
Start: 2022-07-12

## 2022-07-12 NOTE — PROGRESS NOTES
Lesa Curran  20 y.o.  2000  [id number]    Today:  2022    Chief Complaint   Patient presents with    Contraception    Other     STD check       Subjective:  Lesa Curran is a 20 y.o. Climmie Block. Office visit today for STD screening and contraception. HPI:      Patient's last menstrual period was 2022 (exact date). OB History    Para Term  AB Living   2 0 0 1 1 1   SAB IAB Ectopic Molar Multiple Live Births   0 0 0 0 0 0       No Known Allergies    Current Outpatient Medications   Medication Sig    norelgestromin-ethinyl estradiol Chico Abts) 150-35 MCG/24HR Apply patch weekly for 3 weeks then off for 1 week (no patch during 4th week)     No current facility-administered medications for this visit. Past Medical History:   Diagnosis Date    Allergic rhinitis     Eczema     Gonorrhea 2021    [facility] ED    Marijuana abuse     Mild pre-eclampsia in third trimester 2019    Smoker     Trichomonal vaginitis 10/2021       History reviewed. No pertinent surgical history. Social History     Socioeconomic History    Marital status: Single     Spouse name: None    Number of children: None    Years of education: None    Highest education level: None   Occupational History    None   Tobacco Use    Smoking status: Light Tobacco Smoker    Smokeless tobacco: Never Used    Tobacco comment: Quit smokin19 pt was smoking 6x/daily at beg of pregnancy. now down to 2. Vaping Use    Vaping Use: Never used   Substance and Sexual Activity    Alcohol use: Yes    Drug use: No    Sexual activity: Yes     Birth control/protection: None   Other Topics Concern    None   Social History Narrative    First name pronounced:  \"Ni-yah\" (pronounced  \"Eye-yah\")     Social Determinants of Health     Financial Resource Strain:     Difficulty of Paying Living Expenses: Not on file   Food Insecurity:     Worried About [address] in the Last Year: Not on file    Ran Out of Food in the Last Year: Not on file   Transportation Needs:     Lack of Transportation (Medical): Not on file    Lack of Transportation (Non-Medical): Not on file   Physical Activity:     Days of Exercise per Week: Not on file    Minutes of Exercise per Session: Not on file   Stress:     Feeling of Stress : Not on file   Social Connections:     Frequency of Communication with Friends and Family: Not on file    Frequency of Social Gatherings with Friends and Family: Not on file    Attends Synagogue Services: Not on file    Active Member of Clubs or Organizations: Not on file    Attends Club or Organization Meetings: Not on file    Marital Status: Not on file   Intimate Partner Violence:     Fear of Current or Ex-Partner: Not on file    Emotionally Abused: Not on file    Physically Abused: Not on file    Sexually Abused: Not on file   Housing Stability:     Unable to Pay for Housing in the Last Year: Not on file    Number of [place] in the Last Year: Not on file    Unstable Housing in the Last Year: Not on file       Family History   Problem Relation Age of Onset    Diabetes Father     Hypertension Mother     Pulmonary Embolism Neg Hx     Deep Vein Thrombosis Neg Hx     Prostate Cancer Neg Hx     Ovarian Cancer Neg Hx     Breast Cancer Neg Hx     Colon Cancer Neg Hx      Review of Systems    Objective: /74   Ht 5' 1\" (1.549 m)   Wt 139 lb (63 kg)   LMP 06/22/2022 (Exact Date)   BMI 26.26 kg/m²   Heidy Batres is a 20 y.o. female who appears pleasant, well developed, well nourished, with good attention to hygiene and body habitus. In no acute distress. Psych:  Oriented to person, place and time. Mood and affect are normal and appropriate to the situation. Short-term memory and understanding intact    Eyes: Sclera are clear. Conjunctiva and lids within normal limits. No icterus. Ears and Nose: no gross deformities to visual inspection, gross hearing intact   Lymph Nodes:     No groin lymphadenopathy noted.    Skin: No skin rash, subcutaneous nodules, lesions or ulcers observed  Respiratory:   Breathing is non-labored. Lungs clear to auscultation without wheezing or rhonchi   Cardiovascular: RRR, Heart auscultation reveals no murmurs, rubs or gallops appreciated. Abdomen: Soft. No masses, nontender, no guarding or rebound  External genitalia: normal appearing, minimal erythema, no lesions  Vagina: pink with normal rugations, no lesions  Cervix: normal appearing, no lesions, ** no exudate, no CMT  Uterus: midline, normal size, shape and contour  Adnexa: bilaterally  no masses, non tender    A/P:    1.  20 y.o. A1, requests STD screening    2. Contraception, requests patch. F/u 3m-assess response to tx    No follow-ups on file. Orders Placed This Encounter   Procedures    NuSwab Vaginitis Plus (VG+) with Canddia (Six Species)    HIV 1/2 Ag/Ab, 4TH Generation,W Rflx Confirm    Hepatitis B Surface Antigen    Hepatitis C Antibody    RPR    HCG Qualitative, Serum      Diagnosis Orders   1. Screening for STD (sexually transmitted disease)  HIV 1/2 Ag/Ab, 4TH Generation,W Rflx Confirm    Hepatitis B Surface Antigen    Hepatitis C Antibody    NuSwab Vaginitis Plus (VG+) with Canddia (Six Species)    NuSwab Vaginitis Plus (VG+) with Canddia (Six Species)    Hepatitis C Antibody    Hepatitis B Surface Antigen    HIV 1/2 Ag/Ab, 4TH Generation,W Rflx Confirm      2. Unconfirmed pregnancy  HCG Qualitative, Serum    HCG Qualitative, Serum      3. Encounter for initial prescription of transdermal patch hormonal contraceptive device  norelgestromin-ethinyl estradiol Martina Wesley) 150-35 MCG/24HR            More than 50% of this 30+ minute visit was spent addressing the above patient concerns including:  assessment, chart review, physical exam, counseling the patient about the above concerns including evaluation plan/treatment strategies and documentation.   Long conversation with patient, all her questions answered and addressed all her concerns.     Jayson Menezes MD, Clint Boyle

## 2022-07-13 LAB
HBV SURFACE AG SER QL: NONREACTIVE
HCG SERPL QL: NEGATIVE
HCV AB SER QL: NONREACTIVE
HIV 1+2 AB+HIV1 P24 AG SERPL QL IA: NONREACTIVE
HIV 1/2 RESULT COMMENT: NORMAL

## 2022-07-14 LAB — RPR SER QL: NON REACTIVE

## 2022-07-15 LAB
A VAGINAE DNA VAG QL NAA+PROBE: NORMAL SCORE
BVAB2 DNA VAG QL NAA+PROBE: NORMAL SCORE
C ALBICANS DNA VAG QL NAA+PROBE: NEGATIVE
C GLABRATA DNA VAG QL NAA+PROBE: NEGATIVE
C TRACH RRNA SPEC QL NAA+PROBE: NEGATIVE
CANDIDA KRUSEI: NEGATIVE
CANDIDA LUSITANIAE, NAA, 180015: NEGATIVE
CANDIDA PARAPSILOSIS/TROPICALIS: NEGATIVE
MEGA1 DNA VAG QL NAA+PROBE: NORMAL SCORE
N GONORRHOEA RRNA SPEC QL NAA+PROBE: NEGATIVE
T VAGINALIS RRNA SPEC QL NAA+PROBE: NEGATIVE

## 2022-12-08 ENCOUNTER — OFFICE VISIT (OUTPATIENT)
Dept: OBGYN CLINIC | Age: 22
End: 2022-12-08
Payer: MEDICAID

## 2022-12-08 VITALS — DIASTOLIC BLOOD PRESSURE: 70 MMHG | BODY MASS INDEX: 27.02 KG/M2 | SYSTOLIC BLOOD PRESSURE: 108 MMHG | WEIGHT: 143 LBS

## 2022-12-08 DIAGNOSIS — Z11.51 SCREENING FOR HUMAN PAPILLOMAVIRUS (HPV): ICD-10-CM

## 2022-12-08 DIAGNOSIS — Z11.3 SCREENING FOR STDS (SEXUALLY TRANSMITTED DISEASES): ICD-10-CM

## 2022-12-08 DIAGNOSIS — Z01.419 ENCOUNTER FOR WELL WOMAN EXAM WITH ROUTINE GYNECOLOGICAL EXAM: Primary | ICD-10-CM

## 2022-12-08 DIAGNOSIS — Z30.45 ENCOUNTER FOR SURVEILLANCE OF TRANSDERMAL PATCH HORMONAL CONTRACEPTIVE DEVICE: ICD-10-CM

## 2022-12-08 PROBLEM — O36.5990 PREGNANCY AFFECTED BY FETAL GROWTH RESTRICTION: Status: RESOLVED | Noted: 2019-02-08 | Resolved: 2022-12-08

## 2022-12-08 PROBLEM — Z71.89 ENCOUNTER FOR BREAST FEEDING COUNSELING: Status: RESOLVED | Noted: 2018-10-18 | Resolved: 2022-12-08

## 2022-12-08 PROBLEM — O60.00 PRETERM LABOR: Status: RESOLVED | Noted: 2019-02-07 | Resolved: 2022-12-08

## 2022-12-08 PROBLEM — Z34.03 PRIMIGRAVIDA IN THIRD TRIMESTER: Status: RESOLVED | Noted: 2019-01-29 | Resolved: 2022-12-08

## 2022-12-08 PROBLEM — Z37.9 NORMAL LABOR: Status: RESOLVED | Noted: 2019-02-11 | Resolved: 2022-12-08

## 2022-12-08 PROBLEM — O14.03 MILD PRE-ECLAMPSIA IN THIRD TRIMESTER: Status: RESOLVED | Noted: 2019-02-11 | Resolved: 2022-12-08

## 2022-12-08 PROCEDURE — 99395 PREV VISIT EST AGE 18-39: CPT | Performed by: OBSTETRICS & GYNECOLOGY

## 2022-12-08 NOTE — PROGRESS NOTES
Annual Exam  Established ptLouie Kimball   20 y.o.  2000  [id number]    Today:  2022    Patient requests:   well woman annual exam.  Reports:   Periods are light w/ no dysmenorrhea  w/ the Xulane patch. Quit smoking cigarettes, continues to smoke marijuana     BC:   Xulane patch. Past Medical History:   Diagnosis Date    Allergic rhinitis     Eczema     Gonorrhea 2021    [facility] ED    Marijuana abuse     Mild pre-eclampsia in third trimester 2019    Smoker     Trichomonal vaginitis 10/2021       History reviewed. No pertinent surgical history. Family History   Problem Relation Age of Onset    Diabetes Father     Hypertension Mother     Pulmonary Embolism Neg Hx     Deep Vein Thrombosis Neg Hx     Prostate Cancer Neg Hx     Ovarian Cancer Neg Hx     Breast Cancer Neg Hx     Colon Cancer Neg Hx        OB History    Para Term  AB Living   2 1 0 1 1 1   SAB IAB Ectopic Molar Multiple Live Births   0 0 0 0 0 1      # Outcome Date GA Lbr Jeffrey/2nd Weight Sex Delivery Anes PTL Lv   2  19 35w0d 05:30 / 00:18 4 lb 8.8 oz (2.065 kg) M Vag-Spont  Y WILTON      Name: Jer Lawton: 8  Apgar5: 9   1 AB                Social History     Socioeconomic History    Marital status: Single     Spouse name: None    Number of children: None    Years of education: None    Highest education level: None   Tobacco Use    Smoking status: Light Smoker    Smokeless tobacco: Never    Tobacco comments:     Quit smokin19 pt was smoking 6x/daily at beg of pregnancy. now down to 2. Vaping Use    Vaping Use: Never used   Substance and Sexual Activity    Alcohol use: Yes    Drug use: No    Sexual activity: Yes     Birth control/protection: None   Social History Narrative    First name pronounced: \"Ni-yah\" (pronounced  \"Eye-yah\")       Patient Active Problem List    Diagnosis Date Noted    Smoker 2019    Marijuana use 2019     33 w:   UDS is + for marijuana.   She will have f/u UDSs. SW suggests she   attend:   \"[employer]:   [employer]   [phone]  [email or web address]. org   Comprehensive parenting program for pts 13-25 yo:  Classes, mentoring and   support. Connecting young parents to local resources, learns agout:    Pregnancy, parenting and life skills, earns diapers, wipes, baby   equipment, houshold supplies  Or:  Healthy Families: A service for family's who are pregnant or have    babies Professionally trained home visitors provide information so that   you as parents can provide the best for your new baby. This program provides:  1. Weekly visits with your  in your own home  2. Support through your pregnancy and  the early years of your baby's   life  3. Ways to make your home safe for your baby  4. Information on how to care for your baby  5. Facts that will let you know your baby is growing and developing in   healthy ways  6. Activities that you and your baby can enjoy together  7. Access to other community services  UDS 34wks +THC        Tobacco abuse 10/18/2018     Overview:   SBIRT # 1  10/18/18   2-3 cigs per day  Decreased from 5/d  Encouraged to   quit Praised for decreasing    # 2 SBIRT 2 cigs Strongly encouraged to quit Pt open to suggestion   Quit Praised  Last Assessment & Plan:   Quit praised        Anxiety 10/18/2018     Overview:   Never diagnosed  Just feels anxious with things. Happy with FOB & pregnancy  Pt to report any increasing anxiety  Last Assessment & Plan:   Denies any anxiety today        Allergic rhinitis     Eczema         Current Outpatient Medications   Medication Sig    norelgestromin-ethinyl estradiol Brigitte Pardon) 150-35 MCG/24HR Apply patch weekly for 3 weeks then off for 1 week (no patch during 4th week)     No current facility-administered medications for this visit. Review of Systems    Updated from patient's \"Review of Systems\" form that patient completed.        Physical Exam:   /70   Wt 143 lb (64.9 kg)   LMP 11/27/2022 (Exact Date)   BMI 27.02 kg/m² , Patient's last menstrual period was 11/27/2022 (exact date). Patient is a 20 y.o. female who appears pleasant, in no apparent distress, her given age, well developed, well nourished and with good attention to hygiene and body habitus. Oriented to person, place and time. Mood and affect normal and appropriate to situation. Head is normocephalic, atraumatic, without any gross head or neck masses. Neck: Neck exam reveals no abnormalities. Thyroid ~ 3 cm/symmetric, no abnormalities. Lymph nodes:   Neck lymph nodes are normal.   No supraclavicular lymphadenopathy noted. No axillary lymphadenopathy noted. No groin lymphadenopathy noted. Respiratory: Assessment of respiratory effort reveals even and non labored respirations. Lungs CTA. Cardiovascular: Heart auscultation reveals no murmurs, gallop, rubs or clicks. Skin: No skin rash, abnormal appearing nevi, subcutaneous nodules, lesions or ulcers observed. Abdomen: Abdomen soft, non tender, without palpable masses. Palpation of liver reveals no abnormalities with respect to size, tenderness or masses. Palpation of spleen reveals no abnormalities with respect to size, tenderness or masses. Breast: Symmetrical, no: dimpling, retractions, adenopathy, dominant masses or nipple discharge elicited. Breasts show no palpable masses or tenderness. Bilateral Fibrocystic change is:   marked   No changes suspicious for malignancy      Genitourinary:  External genitalia multiple small likely molluscum contagiosum o/w is normal in appearing   Examination of urethral meatus reveals location normal and size normal.   Examination of urethra shows no abnormalities. Examination of vaginal vault reveals no abnormalities. Cervix: shows no pathology. Uterus:  Normal size, shape and contour. Adnexa and parametria show no masses, tenderness, organomegaly or nodularity. Examination of anus and perineum shows no abnormalities. ASSESSMENT and PLAN    1.  AE. Importance of self breast exam reviewed, pt educated how to perform SBE.      2.  Her PCP is, Dr. Dillan Mann (her pediatrician). Requests referral to a PCP       3. Sheila Marshall to discuss vaccines/confirm she is up to date with her PCP, including:    yearly flu and Tdap q 10 y. Unsure when her last Tdap vaccine was, will clarify with her PCP. Flu vaccine: Tdap is:  Also advised her to verify with her insurance regarding coverage of recommended vaccinations, labs and procedures. 4. No h/o abnormal paps. Patient requests pap today. ACS/ASCCP/ASCP recommendations (2012): Pap smear post hysterectomy not advised for: women with evidence of adequate negative prior screening (three consecutive negative cytology results or two consecutive negative co-tests with the previous 10 years, with the most recent test within the previous 5 years) and no history of GONZALO 2 or greater within the last 20 years. Screening should not be resumed for any reason, even if a woman has a new sexual partner. Continued recommendations for yearly PE with bimanual exam and MMG discussed    5. Encouraged to stop smoking marijuana due to respiratory concerns/chronic exposure/impact on her lungs    6. Likely Molluscum contagiosum, consider txg and/or bxg if dx is unsure. 7.  ? Subtle arrhythmia, subtle pause, occasional extra beats, asx. Reassess, have PCP eval if persistent. Osielbernard Trinidadalexandria Re ROS--> non gynecologic concerns referred back to her PCP or appropriate specialist.     Wt Readings from Last 3 Encounters: Wt Readings from Last 3 Encounters:   12/08/22 143 lb (64.9 kg)   07/12/22 139 lb (63 kg)   06/12/22 135 lb (61.2 kg)       BP Readings from Last 3 Encounters:  BP Readings from Last 3 Encounters:   12/08/22 108/70   07/12/22 112/74   06/12/22 (!) 145/85         Diagnosis Orders   1.  Encounter for well woman exam with routine gynecological exam  PAP IG, CT-NG-TV, rfx Aptima HPV ASCUS ([id number])      2. Screening for STDs (sexually transmitted diseases)  PAP IG, CT-NG-TV, rfx Aptima HPV ASCUS ([id number])    Hepatitis C Antibody    Hepatitis B Surface Antigen    RPR    HIV 1/2 Ag/Ab, 4TH Generation,W Rflx Confirm      3. Screening for human papillomavirus (HPV)  PAP IG, CT-NG-TV, rfx Aptima HPV ASCUS ([id number])          Orders Placed This Encounter   Procedures    PAP IG, CT-NG-TV, rfx Aptima HPV ASCUS ([id number])    Hepatitis C Antibody    Hepatitis B Surface Antigen    RPR    HIV 1/2 Ag/Ab, 4TH Generation,W Rflx Confirm       Dictated using voice recognition software.   Proofread but unrecognized errors may exist.    Signed by:  Kylah Cruz MD, Jacinda Newberry

## 2022-12-09 LAB — RPR SER QL: NONREACTIVE

## 2022-12-11 LAB
HBV SURFACE AG SER QL: NONREACTIVE
HCV AB SER QL: NONREACTIVE
HIV 1+2 AB+HIV1 P24 AG SERPL QL IA: NONREACTIVE
HIV 1/2 RESULT COMMENT: NORMAL

## 2022-12-13 LAB
C TRACH RRNA CVX QL NAA+PROBE: NEGATIVE
CYTOLOGIST CVX/VAG CYTO: NORMAL
CYTOLOGY CVX/VAG DOC THIN PREP: NORMAL
HPV REFLEX: NORMAL
Lab: NORMAL
N GONORRHOEA RRNA CVX QL NAA+PROBE: NEGATIVE
PATH REPORT.FINAL DX SPEC: NORMAL
STAT OF ADQ CVX/VAG CYTO-IMP: NORMAL
T VAGINALIS RRNA SPEC QL NAA+PROBE: NEGATIVE

## 2023-01-03 ENCOUNTER — OFFICE VISIT (OUTPATIENT)
Dept: OBGYN CLINIC | Age: 23
End: 2023-01-03
Payer: MEDICAID

## 2023-01-03 VITALS — WEIGHT: 141 LBS | DIASTOLIC BLOOD PRESSURE: 78 MMHG | BODY MASS INDEX: 26.64 KG/M2 | SYSTOLIC BLOOD PRESSURE: 110 MMHG

## 2023-01-03 DIAGNOSIS — B08.1 ANOGENITAL MOLLUSCUM CONTAGIOSUM: Primary | ICD-10-CM

## 2023-01-03 PROCEDURE — 99213 OFFICE O/P EST LOW 20 MIN: CPT | Performed by: OBSTETRICS & GYNECOLOGY

## 2023-01-03 NOTE — PROGRESS NOTES
Lorne Dailey  20 y.o.  2000  [id number]    Today:  23    Chief Complaint   Patient presents with    Molluscum Contagiosum     Subjective:  Lorne Dailey is a 20 y.o. female, [id number], presents today for f/u molluscum contagiosum. Wants to discuss removal of molluscum      Patient's last menstrual period was 2022. OB History    Para Term  AB Living   2 1 0 1 1 1   SAB IAB Ectopic Molar Multiple Live Births   0 0 0 0 0 1       No Known Allergies    Current Outpatient Medications   Medication Sig    norelgestromin-ethinyl estradiol Walker Arabia) 150-35 MCG/24HR Place 1 patch onto the skin once a week Apply patch to abdomen, buttock, upper outer arm or back, rotate application site. Apply 1 patch once weekly for 3 weeks then 1 patch-free week; repeat. (Patient not taking: Reported on 1/3/2023)    norelgestromin-ethinyl estradiol Walker Arabia) 150-35 MCG/24HR Apply patch weekly for 3 weeks then off for 1 week (no patch during 4th week) (Patient not taking: Reported on 1/3/2023)     No current facility-administered medications for this visit. Past Medical History:   Diagnosis Date    Allergic rhinitis     Eczema     Gonorrhea 2021    [facility] ED    Marijuana abuse     Mild pre-eclampsia in third trimester 2019    Smoker     Trichomonal vaginitis 10/2021       History reviewed. No pertinent surgical history. Social History     Socioeconomic History    Marital status: Single     Spouse name: None    Number of children: None    Years of education: None    Highest education level: None   Tobacco Use    Smoking status: Light Smoker    Smokeless tobacco: Never    Tobacco comments:     Quit smokin19 pt was smoking 6x/daily at beg of pregnancy. now down to 2. Vaping Use    Vaping Use: Never used   Substance and Sexual Activity    Alcohol use: Yes    Drug use: No    Sexual activity: Yes     Birth control/protection: None   Social History Narrative    First name pronounced:  \"Ni-yah\" (pronounced  \"Eye-yah\")       Family History   Problem Relation Age of Onset    Diabetes Father     Hypertension Mother     Pulmonary Embolism Neg Hx     Deep Vein Thrombosis Neg Hx     Prostate Cancer Neg Hx     Ovarian Cancer Neg Hx     Breast Cancer Neg Hx     Colon Cancer Neg Hx          Review of Systems           Objective: /78   Wt 141 lb (64 kg)   LMP 12/23/2022   BMI 26.64 kg/m²   Ashley Kehr is a 20 y.o. female who appears pleasant, well developed, well nourished, with good attention to hygiene and body habitus. In no acute distress. Psych:  Oriented to person, place and time. Mood and affect are normal and appropriate to the situation. Short-term memory and understanding intact    Eyes: Sclera are clear. Conjunctiva and lids within normal limits. No icterus. Ears and Nose: no gross deformities to visual inspection, gross hearing intact   Skin: No skin rash, subcutaneous nodules, lesions or ulcers observed  Abdomen: Soft. No masses, nontender, no guarding or rebound,    External genitalia: normal appearing, minimal erythema, no lesions  Vagina: pink with normal rugations, no lesions  Cervix: normal appearing, no lesions,  no exudate, no CMT  Uterus: midline, normal size, shape and contour  Adnexa: bilaterally  no masses, non tender      A/P:    1. Molluscum contagiosum, resolving. Long d/w Zeb Kawasaki,  tx options reviewed, advise expectant management given how may have resolved. F/u 4 wk:  re-assess        ICD-10-CM    1.  Anogenital molluscum contagiosum  B08.1            Gatito Meléndez MD, Ca Jain

## 2023-01-31 ENCOUNTER — OFFICE VISIT (OUTPATIENT)
Dept: OBGYN CLINIC | Age: 23
End: 2023-01-31
Payer: MEDICAID

## 2023-01-31 VITALS
DIASTOLIC BLOOD PRESSURE: 70 MMHG | SYSTOLIC BLOOD PRESSURE: 100 MMHG | HEIGHT: 61 IN | WEIGHT: 144 LBS | BODY MASS INDEX: 27.19 KG/M2

## 2023-01-31 DIAGNOSIS — B08.1 MOLLUSCUM CONTAGIOSUM: Primary | ICD-10-CM

## 2023-01-31 DIAGNOSIS — N76.6 GENITAL LABIAL ULCER: ICD-10-CM

## 2023-01-31 PROCEDURE — 99213 OFFICE O/P EST LOW 20 MIN: CPT | Performed by: OBSTETRICS & GYNECOLOGY

## 2023-02-01 NOTE — PROGRESS NOTES
Nicolas Bai  20 y.o.  2000  [id number]    Today: 2023    Chief Complaint   Patient presents with    Follow-up     molluscum     Subjective:  Nicolas Bai is a 20 y.o. female, [id number], presents today with concerns regarding: molluscum contagiosum    Patient's last menstrual period was 2023 (exact date). OB History    Para Term  AB Living   2 1 0 1 1 1   SAB IAB Ectopic Molar Multiple Live Births   0 0 0 0 0 1       No Known Allergies    Current Outpatient Medications   Medication Sig    podofilox (CONDYLOX) 0.5 % gel Apply topically q 12 hours daily for 3 days then stop for 4 days. Repeat weekly, maximum 4 weeks    norelgestromin-ethinyl estradiol Yelitza Minks) 150-35 MCG/24HR Place 1 patch onto the skin once a week Apply patch to abdomen, buttock, upper outer arm or back, rotate application site. Apply 1 patch once weekly for 3 weeks then 1 patch-free week; repeat. (Patient not taking: No sig reported)    norelgestromin-ethinyl estradiol Yelitza Minks) 150-35 MCG/24HR Apply patch weekly for 3 weeks then off for 1 week (no patch during 4th week) (Patient not taking: No sig reported)     No current facility-administered medications for this visit. Past Medical History:   Diagnosis Date    Allergic rhinitis     Eczema     Gonorrhea 2021    [facility] ED    Marijuana abuse     Mild pre-eclampsia in third trimester 2019    Smoker     Trichomonal vaginitis 10/2021       History reviewed. No pertinent surgical history. Social History     Socioeconomic History    Marital status: Single     Spouse name: None    Number of children: None    Years of education: None    Highest education level: None   Tobacco Use    Smoking status: Light Smoker    Smokeless tobacco: Never    Tobacco comments:     Quit smokin19 pt was smoking 6x/daily at beg of pregnancy. now down to 2.    Vaping Use    Vaping Use: Never used   Substance and Sexual Activity    Alcohol use: Yes    Drug use: No    Sexual activity: Yes     Birth control/protection: None   Social History Narrative    First name pronounced: \"Ni-yah\" (pronounced  \"Eye-yah\")       Family History   Problem Relation Age of Onset    Diabetes Father     Hypertension Mother     Pulmonary Embolism Neg Hx     Deep Vein Thrombosis Neg Hx     Prostate Cancer Neg Hx     Ovarian Cancer Neg Hx     Breast Cancer Neg Hx     Colon Cancer Neg Hx      Review of Systems   Genitourinary:  Positive for genital sores. Multiple scattered persistent tiny papules c/w molluscum contagiosum     Objective: /70   Ht 5' 1\" (1.549 m)   Wt 144 lb (65.3 kg)   LMP 01/17/2023 (Exact Date)   BMI 27.21 kg/m²   Donovan Lin is a 20 y.o. female who appears pleasant, well developed, well nourished, with good attention to hygiene and body habitus. In no acute distress. Psych:  Oriented to person, place and time. Mood and affect are normal and appropriate to the situation. Short-term memory and understanding intact    Eyes: Sclera are clear. Conjunctiva and lids within normal limits. No icterus. Ears and Nose: no gross deformities to visual inspection, gross hearing intact. Nasal septum pierced  Skin: No skin rash, subcutaneous nodules, lesions or ulcers observed  External genitalia: normal appearing, minimal erythema, no lesions  Vagina: pink with normal rugations, no lesions  Physical Exam  Genitourinary:           A/P:    1. Persistent molluscum treatment options include:   Cryo  Curettage  Cantharidin  Podofilox (Condylox)    Patient has to go to work after today's office visit prefers at home treatment-->Condylox    2. Right inner labia, small ulceration suspicious for HSV, per patient no history of HSV, clarify w/ serologies    3. Birth control: Likes her Xulane patches    F/u 6 weeks-assess response to tx        ICD-10-CM    1. Molluscum contagiosum  B08.1 podofilox (CONDYLOX) 0.5 % gel      2.  Genital labial ulcer  N76.6 HSV Type 2-Specific Abs, IgG W/Refl Supplemental Testing     CANCELED: Miscellaneous Sendout     CANCELED: Miscellaneous Sendout           Orders Placed This Encounter   Procedures    HSV Type 2-Specific Abs, IgG W/Refl Supplemental Testing    HSV 1 Glycoprotein G AB, IgG       Andree Pierce MD, Killian Shoemaker

## 2023-02-02 LAB
HSV1 IGG SER IA-ACNC: <0.91 INDEX (ref 0–0.9)
HSV2 IGG SER IA-ACNC: >23.6 INDEX (ref 0–0.9)

## 2023-02-17 DIAGNOSIS — B08.1 MOLLUSCUM CONTAGIOSUM: Primary | ICD-10-CM

## 2023-03-02 ENCOUNTER — OFFICE VISIT (OUTPATIENT)
Dept: OBGYN CLINIC | Age: 23
End: 2023-03-02
Payer: MEDICAID

## 2023-03-02 VITALS
BODY MASS INDEX: 27.19 KG/M2 | DIASTOLIC BLOOD PRESSURE: 68 MMHG | HEIGHT: 61 IN | SYSTOLIC BLOOD PRESSURE: 116 MMHG | WEIGHT: 144 LBS

## 2023-03-02 DIAGNOSIS — R76.8 HSV-2 SEROPOSITIVE: ICD-10-CM

## 2023-03-02 DIAGNOSIS — B08.1 ANOGENITAL MOLLUSCUM CONTAGIOSUM: Primary | ICD-10-CM

## 2023-03-02 PROCEDURE — 99213 OFFICE O/P EST LOW 20 MIN: CPT | Performed by: OBSTETRICS & GYNECOLOGY

## 2023-03-02 SDOH — ECONOMIC STABILITY: FOOD INSECURITY: WITHIN THE PAST 12 MONTHS, THE FOOD YOU BOUGHT JUST DIDN'T LAST AND YOU DIDN'T HAVE MONEY TO GET MORE.: PATIENT DECLINED

## 2023-03-02 SDOH — ECONOMIC STABILITY: FOOD INSECURITY: WITHIN THE PAST 12 MONTHS, YOU WORRIED THAT YOUR FOOD WOULD RUN OUT BEFORE YOU GOT MONEY TO BUY MORE.: PATIENT DECLINED

## 2023-03-02 SDOH — ECONOMIC STABILITY: INCOME INSECURITY: HOW HARD IS IT FOR YOU TO PAY FOR THE VERY BASICS LIKE FOOD, HOUSING, MEDICAL CARE, AND HEATING?: PATIENT DECLINED

## 2023-03-02 SDOH — ECONOMIC STABILITY: HOUSING INSECURITY
IN THE LAST 12 MONTHS, WAS THERE A TIME WHEN YOU DID NOT HAVE A STEADY PLACE TO SLEEP OR SLEPT IN A SHELTER (INCLUDING NOW)?: NO

## 2023-03-02 ASSESSMENT — PATIENT HEALTH QUESTIONNAIRE - PHQ9
SUM OF ALL RESPONSES TO PHQ QUESTIONS 1-9: 0
2. FEELING DOWN, DEPRESSED OR HOPELESS: 0
SUM OF ALL RESPONSES TO PHQ QUESTIONS 1-9: 0
SUM OF ALL RESPONSES TO PHQ QUESTIONS 1-9: 0
SUM OF ALL RESPONSES TO PHQ9 QUESTIONS 1 & 2: 0
SUM OF ALL RESPONSES TO PHQ QUESTIONS 1-9: 0
1. LITTLE INTEREST OR PLEASURE IN DOING THINGS: 0

## 2023-03-02 NOTE — PROGRESS NOTES
Nicolas Bai  20 y.o.  2000  [id number]    Today: 3/2/2023    Chief Complaint   Patient presents with    Skin Problem     Wants molluscum contagiosum removed      Subjective:  20 y.o. female, [id number], f/u molluscum contagiosum & + HSV serology    Patient's last menstrual period was 2023 (exact date). OB History    Para Term  AB Living   2 1 0 1 1 1   SAB IAB Ectopic Molar Multiple Live Births   0 0 0 0 0 1       No Known Allergies    No current outpatient medications on file. No current facility-administered medications for this visit. Past Medical History:   Diagnosis Date    Allergic rhinitis     Eczema     Gonorrhea 2021     ED    HSV-2 seropositive     Marijuana abuse     Mild pre-eclampsia in third trimester 2019    Smoker     Trichomonal vaginitis 10/2021       History reviewed. No pertinent surgical history. Social History     Socioeconomic History    Marital status: Single     Spouse name: None    Number of children: None    Years of education: None    Highest education level: None   Tobacco Use    Smoking status: Light Smoker    Smokeless tobacco: Never    Tobacco comments:     Quit smokin19 pt was smoking 6x/daily at beg of pregnancy. now down to 2. Vaping Use    Vaping Use: Never used   Substance and Sexual Activity    Alcohol use: Yes    Drug use: No    Sexual activity: Yes     Partners: Male     Birth control/protection: None   Social History Narrative    First name pronounced:  \"Ni-yah\" (pronounced  \"Eye-yah\")     Social Determinants of Health     Financial Resource Strain: Unknown    Difficulty of Paying Living Expenses: Patient refused   Food Insecurity: Unknown    Worried About Running Out of Food in the Last Year: Patient refused    [address] in the Last Year: Patient refused   Transportation Needs: Unknown    Lack of Transportation (Non-Medical): No   Housing Stability: Unknown    Unstable Housing in the Last Year: No       Family History   Problem Relation Age of Onset    Diabetes Father     Hypertension Mother     Pulmonary Embolism Neg Hx     Deep Vein Thrombosis Neg Hx     Prostate Cancer Neg Hx     Ovarian Cancer Neg Hx     Breast Cancer Neg Hx     Colon Cancer Neg Hx      Review of Systems   Genitourinary:  Positive for genital sores. Multiple scattered flat hyperpigmented ovales c/w resolved molluscum contagiosum     Objective: /68   Ht 5' 1\" (1.549 m)   Wt 144 lb (65.3 kg)   LMP 02/11/2023 (Exact Date)   BMI 27.21 kg/m²   Jhonatan Alejandra is a 20 y.o. female who appears pleasant, well developed, well nourished, with good attention to hygiene and body habitus. In no acute distress. Psych:  Oriented to person, place and time. Mood and affect are normal and appropriate to the situation. Short-term memory and understanding intact    Eyes: Sclera are clear. Conjunctiva and lids within normal limits. No icterus. Ears and Nose: no gross deformities to visual inspection, gross hearing intact. Nasal septum pierced  Skin: No skin rash, subcutaneous nodules, lesions or ulcers observed  External genitalia: normal appearing, minimal erythema, All but 1 molluscum lesion has resolved. One persistent lesion,  slightly raised  Right inner labial ulceration resolved  Vagina: pink with normal rugations, no lesions     A/P:    1.  Multiple molluscum lesions resolved. Continue expectant management for the one remaining lesion. 2.  Previously noted right inner labia, small ulceration resolved. HSV serologies reviewed. Future tx options reviewed. 3.  Birth control: Likes her Xulane patches  Importance of condoms w/ all encounters to protect her from other STDs including HIV discussed. Pt acknowledges understanding, states she plans to use condoms. F/u 12/2023 AE        ICD-10-CM    1. Anogenital molluscum contagiosum  B08.1       2.  HSV-2 seropositive  R76.8           Rudolph Santos MD, FACOG

## 2023-04-07 ENCOUNTER — HOSPITAL ENCOUNTER (EMERGENCY)
Age: 23
Discharge: HOME | End: 2023-04-07
Attending: EMERGENCY MEDICINE
Payer: COMMERCIAL

## 2023-04-07 VITALS
HEIGHT: 61 IN | SYSTOLIC BLOOD PRESSURE: 140 MMHG | OXYGEN SATURATION: 98 % | WEIGHT: 140 LBS | HEART RATE: 88 BPM | TEMPERATURE: 99 F | DIASTOLIC BLOOD PRESSURE: 80 MMHG | RESPIRATION RATE: 18 BRPM | BODY MASS INDEX: 26.43 KG/M2

## 2023-04-07 DIAGNOSIS — J06.9 VIRAL URI WITH COUGH: Primary | ICD-10-CM

## 2023-04-07 DIAGNOSIS — R06.2 WHEEZING: ICD-10-CM

## 2023-04-07 LAB
FLUAV RNA SPEC QL NAA+PROBE: NOT DETECTED
FLUBV RNA SPEC QL NAA+PROBE: NOT DETECTED
SARS-COV-2 RDRP RESP QL NAA+PROBE: NOT DETECTED
SOURCE: NORMAL

## 2023-04-07 PROCEDURE — 87635 SARS-COV-2 COVID-19 AMP PRB: CPT

## 2023-04-07 PROCEDURE — 87502 INFLUENZA DNA AMP PROBE: CPT

## 2023-04-07 RX ORDER — ALBUTEROL SULFATE 90 UG/1
2 AEROSOL, METERED RESPIRATORY (INHALATION) EVERY 6 HOURS PRN
Qty: 18 G | Refills: 0 | Status: SHIPPED | OUTPATIENT
Start: 2023-04-07

## 2023-04-07 RX ORDER — CETIRIZINE HYDROCHLORIDE 10 MG/1
10 TABLET ORAL DAILY
Qty: 30 TABLET | Refills: 0 | Status: SHIPPED | OUTPATIENT
Start: 2023-04-07 | End: 2023-05-07

## 2023-04-07 ASSESSMENT — PAIN - FUNCTIONAL ASSESSMENT: PAIN_FUNCTIONAL_ASSESSMENT: 0-10

## 2023-04-07 ASSESSMENT — LIFESTYLE VARIABLES
HOW OFTEN DO YOU HAVE A DRINK CONTAINING ALCOHOL: NEVER
HOW MANY STANDARD DRINKS CONTAINING ALCOHOL DO YOU HAVE ON A TYPICAL DAY: PATIENT DOES NOT DRINK

## 2023-04-07 ASSESSMENT — ENCOUNTER SYMPTOMS
GASTROINTESTINAL NEGATIVE: 1
BACK PAIN: 0
COUGH: 1

## 2023-04-07 NOTE — Clinical Note
Francois Guerrero was seen and treated in our emergency department on 4/7/2023. She may return to work on 04/08/2023. If you have any questions or concerns, please don't hesitate to call.       Abraham Lutz MD

## 2023-04-07 NOTE — ED PROVIDER NOTES
errors may be present. This note has not been completely proofread for errors.      Natalya Canales MD  04/07/23 1128

## 2023-04-07 NOTE — Clinical Note
Seb Gandhi was seen and treated in our emergency department on 4/7/2023. She may return to work on 04/08/2023. If you have any questions or concerns, please don't hesitate to call.       Ness Hurtado MD

## 2023-04-07 NOTE — ED NOTES
I have reviewed discharge instructions with the patient. The patient verbalized understanding. Patient left ED via Discharge Method: ambulatory to Home     Opportunity for questions and clarification provided. Patient given 2 scripts. To continue your aftercare when you leave the hospital, you may receive an automated call from our care team to check in on how you are doing. This is a free service and part of our promise to provide the best care and service to meet your aftercare needs.  If you have questions, or wish to unsubscribe from this service please call [phone]. Thank you for Choosing our [facility] Emergency Department.        Estefanía Garrett RN  04/07/23 0139

## 2023-04-07 NOTE — Clinical Note
Sophie Ignacio was seen and treated in our emergency department on 4/7/2023. She may return to work on 04/08/2023. If you have any questions or concerns, please don't hesitate to call.       Kevin Logan MD

## 2023-04-24 ENCOUNTER — TELEPHONE (OUTPATIENT)
Dept: OBGYN CLINIC | Age: 23
End: 2023-04-24

## 2023-04-24 NOTE — TELEPHONE ENCOUNTER
Called patient to follow up on Mirena request and patient stated that she would like to hold off on moving forward with the Mirena. Patient stated that if she decided that she wanted a device, she would let us know. At that time we would get her to sign a new authorization form.

## 2023-05-12 ENCOUNTER — PATIENT MESSAGE (OUTPATIENT)
Dept: OBGYN CLINIC | Age: 23
End: 2023-05-12

## 2023-05-12 DIAGNOSIS — B00.9 HSV-2 INFECTION: Primary | ICD-10-CM

## 2023-05-16 RX ORDER — ACYCLOVIR 50 MG/G
OINTMENT TOPICAL
Qty: 30 G | Refills: 2 | Status: SHIPPED | OUTPATIENT
Start: 2023-05-16 | End: 2023-05-23

## 2023-05-16 NOTE — TELEPHONE ENCOUNTER
Rx sent to [facility]     Orders Placed This Encounter   Medications    acyclovir (ZOVIRAX) 5 % ointment     Sig: Apply topically 5 times daily x 4 days     Dispense:  30 g     Refill:  2

## 2023-08-17 SDOH — HEALTH STABILITY: PHYSICAL HEALTH: ON AVERAGE, HOW MANY DAYS PER WEEK DO YOU ENGAGE IN MODERATE TO STRENUOUS EXERCISE (LIKE A BRISK WALK)?: 0 DAYS

## 2023-08-18 ENCOUNTER — OFFICE VISIT (OUTPATIENT)
Dept: FAMILY MEDICINE CLINIC | Facility: CLINIC | Age: 23
End: 2023-08-18
Payer: COMMERCIAL

## 2023-08-18 VITALS
SYSTOLIC BLOOD PRESSURE: 112 MMHG | OXYGEN SATURATION: 98 % | WEIGHT: 147 LBS | HEIGHT: 61 IN | HEART RATE: 68 BPM | RESPIRATION RATE: 16 BRPM | DIASTOLIC BLOOD PRESSURE: 76 MMHG | BODY MASS INDEX: 27.75 KG/M2

## 2023-08-18 DIAGNOSIS — Z12.4 SCREENING FOR CERVICAL CANCER: ICD-10-CM

## 2023-08-18 DIAGNOSIS — Z23 ENCOUNTER FOR IMMUNIZATION: ICD-10-CM

## 2023-08-18 DIAGNOSIS — R76.8 HSV-2 SEROPOSITIVE: ICD-10-CM

## 2023-08-18 DIAGNOSIS — Z13.1 SCREENING FOR DIABETES MELLITUS: ICD-10-CM

## 2023-08-18 DIAGNOSIS — Z00.00 LABORATORY EXAM ORDERED AS PART OF ROUTINE GENERAL MEDICAL EXAMINATION: ICD-10-CM

## 2023-08-18 DIAGNOSIS — I83.91 ASYMPTOMATIC VARICOSE VEINS OF RIGHT LOWER EXTREMITY: Primary | ICD-10-CM

## 2023-08-18 PROBLEM — F12.90 MARIJUANA USE: Status: RESOLVED | Noted: 2019-02-05 | Resolved: 2023-08-18

## 2023-08-18 PROBLEM — F41.9 ANXIETY: Status: RESOLVED | Noted: 2018-10-18 | Resolved: 2023-08-18

## 2023-08-18 PROBLEM — F17.200 SMOKER: Status: RESOLVED | Noted: 2019-02-11 | Resolved: 2023-08-18

## 2023-08-18 PROBLEM — Z72.0 TOBACCO ABUSE: Status: RESOLVED | Noted: 2018-10-18 | Resolved: 2023-08-18

## 2023-08-18 LAB
ALBUMIN SERPL-MCNC: 4.2 G/DL (ref 3.5–5)
ALBUMIN/GLOB SERPL: 1.1 (ref 0.4–1.6)
ALP SERPL-CCNC: 91 U/L (ref 50–136)
ALT SERPL-CCNC: 21 U/L (ref 12–65)
ANION GAP SERPL CALC-SCNC: 6 MMOL/L (ref 2–11)
AST SERPL-CCNC: 19 U/L (ref 15–37)
BASOPHILS # BLD: 0 K/UL (ref 0–0.2)
BASOPHILS NFR BLD: 1 % (ref 0–2)
BILIRUB SERPL-MCNC: 0.7 MG/DL (ref 0.2–1.1)
BUN SERPL-MCNC: 7 MG/DL (ref 6–23)
CALCIUM SERPL-MCNC: 9.8 MG/DL (ref 8.3–10.4)
CHLORIDE SERPL-SCNC: 109 MMOL/L (ref 101–110)
CHOLEST SERPL-MCNC: 177 MG/DL
CO2 SERPL-SCNC: 25 MMOL/L (ref 21–32)
CREAT SERPL-MCNC: 0.8 MG/DL (ref 0.6–1)
DIFFERENTIAL METHOD BLD: ABNORMAL
EOSINOPHIL # BLD: 0.1 K/UL (ref 0–0.8)
EOSINOPHIL NFR BLD: 2 % (ref 0.5–7.8)
ERYTHROCYTE [DISTWIDTH] IN BLOOD BY AUTOMATED COUNT: 15.4 % (ref 11.9–14.6)
GLOBULIN SER CALC-MCNC: 3.9 G/DL (ref 2.8–4.5)
GLUCOSE SERPL-MCNC: 80 MG/DL (ref 65–100)
HCT VFR BLD AUTO: 40.6 % (ref 35.8–46.3)
HDLC SERPL-MCNC: 77 MG/DL (ref 40–60)
HDLC SERPL: 2.3
HGB BLD-MCNC: 12.5 G/DL (ref 11.7–15.4)
IMM GRANULOCYTES # BLD AUTO: 0 K/UL (ref 0–0.5)
IMM GRANULOCYTES NFR BLD AUTO: 0 % (ref 0–5)
LDLC SERPL CALC-MCNC: 86 MG/DL
LYMPHOCYTES # BLD: 1.8 K/UL (ref 0.5–4.6)
LYMPHOCYTES NFR BLD: 40 % (ref 13–44)
MCH RBC QN AUTO: 24.1 PG (ref 26.1–32.9)
MCHC RBC AUTO-ENTMCNC: 30.8 G/DL (ref 31.4–35)
MCV RBC AUTO: 78.2 FL (ref 82–102)
MONOCYTES # BLD: 0.5 K/UL (ref 0.1–1.3)
MONOCYTES NFR BLD: 11 % (ref 4–12)
NEUTS SEG # BLD: 2 K/UL (ref 1.7–8.2)
NEUTS SEG NFR BLD: 45 % (ref 43–78)
NRBC # BLD: 0 K/UL (ref 0–0.2)
PLATELET # BLD AUTO: 322 K/UL (ref 150–450)
PMV BLD AUTO: 10 FL (ref 9.4–12.3)
POTASSIUM SERPL-SCNC: 4.8 MMOL/L (ref 3.5–5.1)
PROT SERPL-MCNC: 8.1 G/DL (ref 6.3–8.2)
RBC # BLD AUTO: 5.19 M/UL (ref 4.05–5.2)
SODIUM SERPL-SCNC: 140 MMOL/L (ref 133–143)
TRIGL SERPL-MCNC: 70 MG/DL (ref 35–150)
TSH W FREE THYROID IF ABNORMAL: 1.64 UIU/ML (ref 0.36–3.74)
VLDLC SERPL CALC-MCNC: 14 MG/DL (ref 6–23)
WBC # BLD AUTO: 4.5 K/UL (ref 4.3–11.1)

## 2023-08-18 PROCEDURE — G8419 CALC BMI OUT NRM PARAM NOF/U: HCPCS | Performed by: NURSE PRACTITIONER

## 2023-08-18 PROCEDURE — G8427 DOCREV CUR MEDS BY ELIG CLIN: HCPCS | Performed by: NURSE PRACTITIONER

## 2023-08-18 PROCEDURE — 99203 OFFICE O/P NEW LOW 30 MIN: CPT | Performed by: NURSE PRACTITIONER

## 2023-08-18 PROCEDURE — 1036F TOBACCO NON-USER: CPT | Performed by: NURSE PRACTITIONER

## 2023-08-18 RX ORDER — ACYCLOVIR 50 MG/G
OINTMENT TOPICAL
COMMUNITY
Start: 2023-06-09 | End: 2023-08-18

## 2023-08-18 ASSESSMENT — ENCOUNTER SYMPTOMS
RECTAL PAIN: 0
SINUS PRESSURE: 0
FACIAL SWELLING: 0
SINUS PAIN: 0
TROUBLE SWALLOWING: 0
EYE DISCHARGE: 0
NAUSEA: 0
BACK PAIN: 0
ALLERGIC/IMMUNOLOGIC NEGATIVE: 1
CONSTIPATION: 0
STRIDOR: 0
DIARRHEA: 0
ANAL BLEEDING: 0
RHINORRHEA: 0
GASTROINTESTINAL NEGATIVE: 1
EYES NEGATIVE: 1
VOMITING: 0
CHOKING: 0
VOICE CHANGE: 0
SORE THROAT: 0
ABDOMINAL PAIN: 0
APNEA: 0
CHEST TIGHTNESS: 0
BLOOD IN STOOL: 0
RESPIRATORY NEGATIVE: 1
COUGH: 0
ABDOMINAL DISTENTION: 0
COLOR CHANGE: 0
WHEEZING: 0
EYE PAIN: 0
SHORTNESS OF BREATH: 0

## 2023-08-18 NOTE — PROGRESS NOTES
Final result     Visible to patient: Yes (seen)     Next appt: 10/06/2023 at 10:00 AM in Family Medicine (TED Jorge NP)     Dx: Screening for STDs (sexually transmit. ..     2 Result Notes       Component Ref Range & Units    Hepatitis B Surface Ag NONREACTIVE   NONREACTIVE    Resulting Agency  [facility]      Hepatitis C Antibody  Order: [id number]  Status: Final result     Visible to patient: Yes (seen)     Next appt: 10/06/2023 at 10:00 AM in Family Medicine (TED Jorge NP)     Dx: Screening for STDs (sexually transmit. ..     2 Result Notes    1 HM Topic       Component Ref Range & Units    Hepatitis C Ab NONREACTIVE   NONREACTIVE    Resulting Agency  [facility]          PLEASE NOTE:  This document has been produced using voice recognition software. Unrecognized errors in transcription may be present. An electronic signature was used to authenticate this note.   -- TED Jorge - NP

## 2023-08-19 LAB
EST. AVERAGE GLUCOSE BLD GHB EST-MCNC: 111 MG/DL
HBA1C MFR BLD: 5.5 % (ref 4.8–5.6)

## 2023-09-14 ENCOUNTER — TELEPHONE (OUTPATIENT)
Dept: FAMILY MEDICINE CLINIC | Facility: CLINIC | Age: 23
End: 2023-09-14

## 2023-09-14 NOTE — TELEPHONE ENCOUNTER
----- Message from Sheila Castro sent at 9/12/2023 10:35 AM EDT -----  Subject: Message to Provider    QUESTIONS  Information for Provider? PT called in stating that she missed her appt to   the [facility] specialist doctor that was set up by Dr. Attila Phillip. She needs to   reschedule that appt. Please advise  ---------------------------------------------------------------------------  --------------  Deniz Castellon INFO  [phone]; OK to leave message on voicemail  ---------------------------------------------------------------------------  --------------  SCRIPT ANSWERS  Relationship to Patient?  Self

## 2024-03-08 ENCOUNTER — TELEMEDICINE (OUTPATIENT)
Dept: FAMILY MEDICINE CLINIC | Facility: CLINIC | Age: 24
End: 2024-03-08
Payer: COMMERCIAL

## 2024-03-08 DIAGNOSIS — F41.8 ANXIETY WITH DEPRESSION: Primary | ICD-10-CM

## 2024-03-08 PROBLEM — H66.93 BILATERAL OTITIS MEDIA: Status: RESOLVED | Noted: 2022-06-27 | Resolved: 2024-03-08

## 2024-03-08 PROBLEM — F41.9 ANXIETY: Status: ACTIVE | Noted: 2018-10-18

## 2024-03-08 PROBLEM — H66.93 BILATERAL OTITIS MEDIA: Status: ACTIVE | Noted: 2022-06-27

## 2024-03-08 PROBLEM — I83.91 ASYMPTOMATIC VARICOSE VEINS OF RIGHT LOWER EXTREMITY: Status: RESOLVED | Noted: 2023-08-18 | Resolved: 2024-03-08

## 2024-03-08 PROCEDURE — 99213 OFFICE O/P EST LOW 20 MIN: CPT | Performed by: NURSE PRACTITIONER

## 2024-03-08 RX ORDER — ESCITALOPRAM OXALATE 10 MG/1
10 TABLET ORAL DAILY
Qty: 30 TABLET | Refills: 5 | Status: SHIPPED | OUTPATIENT
Start: 2024-03-08

## 2024-03-08 SDOH — ECONOMIC STABILITY: FOOD INSECURITY: WITHIN THE PAST 12 MONTHS, YOU WORRIED THAT YOUR FOOD WOULD RUN OUT BEFORE YOU GOT MONEY TO BUY MORE.: NEVER TRUE

## 2024-03-08 SDOH — ECONOMIC STABILITY: INCOME INSECURITY: HOW HARD IS IT FOR YOU TO PAY FOR THE VERY BASICS LIKE FOOD, HOUSING, MEDICAL CARE, AND HEATING?: NOT HARD AT ALL

## 2024-03-08 SDOH — ECONOMIC STABILITY: FOOD INSECURITY: WITHIN THE PAST 12 MONTHS, THE FOOD YOU BOUGHT JUST DIDN'T LAST AND YOU DIDN'T HAVE MONEY TO GET MORE.: NEVER TRUE

## 2024-03-08 ASSESSMENT — PATIENT HEALTH QUESTIONNAIRE - PHQ9
SUM OF ALL RESPONSES TO PHQ QUESTIONS 1-9: 10
2. FEELING DOWN, DEPRESSED OR HOPELESS: 0
SUM OF ALL RESPONSES TO PHQ QUESTIONS 1-9: 10
5. POOR APPETITE OR OVEREATING: 2
6. FEELING BAD ABOUT YOURSELF - OR THAT YOU ARE A FAILURE OR HAVE LET YOURSELF OR YOUR FAMILY DOWN: 0
SUM OF ALL RESPONSES TO PHQ9 QUESTIONS 1 & 2: 2
1. LITTLE INTEREST OR PLEASURE IN DOING THINGS: 2
7. TROUBLE CONCENTRATING ON THINGS, SUCH AS READING THE NEWSPAPER OR WATCHING TELEVISION: 3
3. TROUBLE FALLING OR STAYING ASLEEP: 0
4. FEELING TIRED OR HAVING LITTLE ENERGY: 2
SUM OF ALL RESPONSES TO PHQ QUESTIONS 1-9: 10
10. IF YOU CHECKED OFF ANY PROBLEMS, HOW DIFFICULT HAVE THESE PROBLEMS MADE IT FOR YOU TO DO YOUR WORK, TAKE CARE OF THINGS AT HOME, OR GET ALONG WITH OTHER PEOPLE: 1
9. THOUGHTS THAT YOU WOULD BE BETTER OFF DEAD, OR OF HURTING YOURSELF: 0
8. MOVING OR SPEAKING SO SLOWLY THAT OTHER PEOPLE COULD HAVE NOTICED. OR THE OPPOSITE, BEING SO FIGETY OR RESTLESS THAT YOU HAVE BEEN MOVING AROUND A LOT MORE THAN USUAL: 1
SUM OF ALL RESPONSES TO PHQ QUESTIONS 1-9: 10

## 2024-03-08 ASSESSMENT — ENCOUNTER SYMPTOMS
CONSTIPATION: 0
EYES NEGATIVE: 1
COLOR CHANGE: 0
NAUSEA: 0
BLOOD IN STOOL: 0
FACIAL SWELLING: 0
ABDOMINAL DISTENTION: 0
CHOKING: 0
CHEST TIGHTNESS: 0
DIARRHEA: 0
GASTROINTESTINAL NEGATIVE: 1
STRIDOR: 0
RECTAL PAIN: 0
ABDOMINAL PAIN: 0
SHORTNESS OF BREATH: 0
ANAL BLEEDING: 0
BACK PAIN: 0
WHEEZING: 0
TROUBLE SWALLOWING: 0
EYE DISCHARGE: 0
APNEA: 0
VOICE CHANGE: 0
SINUS PAIN: 0
COUGH: 0
VOMITING: 0
EYE PAIN: 0
ALLERGIC/IMMUNOLOGIC NEGATIVE: 1
RESPIRATORY NEGATIVE: 1
RHINORRHEA: 0
SINUS PRESSURE: 0
SORE THROAT: 0

## 2024-03-08 ASSESSMENT — ANXIETY QUESTIONNAIRES
5. BEING SO RESTLESS THAT IT IS HARD TO SIT STILL: 2
3. WORRYING TOO MUCH ABOUT DIFFERENT THINGS: 3
GAD7 TOTAL SCORE: 15
6. BECOMING EASILY ANNOYED OR IRRITABLE: 1
4. TROUBLE RELAXING: 2
2. NOT BEING ABLE TO STOP OR CONTROL WORRYING: 3
1. FEELING NERVOUS, ANXIOUS, OR ON EDGE: 3
IF YOU CHECKED OFF ANY PROBLEMS ON THIS QUESTIONNAIRE, HOW DIFFICULT HAVE THESE PROBLEMS MADE IT FOR YOU TO DO YOUR WORK, TAKE CARE OF THINGS AT HOME, OR GET ALONG WITH OTHER PEOPLE: SOMEWHAT DIFFICULT
7. FEELING AFRAID AS IF SOMETHING AWFUL MIGHT HAPPEN: 1

## 2024-03-08 NOTE — PROGRESS NOTES
[place] [address]  Phone: [phone] Fax [phone]  Michele Tamez MS, APRN, FNP-C  3/8/2024    Mona Borrero is a 23 y.o. female who was seen by synchronous (real-time) audio-video technology on 3/8/2024 for Anxiety (See below)    Pt reports having periods of anxiety off and on for several years but has never seen a provider about it. Pt denies any new stressors in her life but reports recently that she feels like she has had increased anxious moods. Pt reports that she worries for no particular reason. Pt reports that she has also had periods of emotional lability where she will cry or feel sad for no particular reason. Pt denies any severe panic attacks. Pt denies any SI, HI, hallucinations. Pt is interested in starting on medication and being referred for counseling. LMP-2/17/24 per pt.     Assessment & Plan:     1. Anxiety with depression  Pt reports having periods of anxiety off and on for several years but has never seen a provider about it. Pt denies any new stressors in her life but reports recently that she feels like she has had increased anxious moods. Pt reports that she worries for no particular reason. Pt reports that she has also had periods of emotional lability where she will cry or feel sad for no particular reason. Pt denies any severe panic attacks. Pt denies any SI, HI, hallucinations. Pt is interested in starting on medication and being referred for counseling. Discussed with pt. Will start pt on Lexapro 10 mg po daily. Will refer pt to counselor as well as I feel pt will really benefit from this. Pt to f/u with me in 4 weeks for VV. Pt agrees to call sooner for concerns/new or worsening symptoms. Will monitor.   - escitalopram (LEXAPRO) 10 MG tablet; Take 1 tablet by mouth daily  Dispense: 30 tablet; Refill: 5  - External Referral to Psychiatry          Return in about 4 weeks (around 4/5/2024) for Virtual Visit to recheck mental

## 2024-04-12 ENCOUNTER — TELEMEDICINE (OUTPATIENT)
Dept: FAMILY MEDICINE CLINIC | Facility: CLINIC | Age: 24
End: 2024-04-12

## 2024-04-12 DIAGNOSIS — F41.8 ANXIETY WITH DEPRESSION: Primary | ICD-10-CM

## 2024-04-12 DIAGNOSIS — Z00.00 LABORATORY EXAM ORDERED AS PART OF ROUTINE GENERAL MEDICAL EXAMINATION: ICD-10-CM

## 2024-04-12 DIAGNOSIS — Z23 ENCOUNTER FOR IMMUNIZATION: ICD-10-CM

## 2024-04-12 DIAGNOSIS — Z12.4 SCREENING FOR CERVICAL CANCER: ICD-10-CM

## 2024-04-12 RX ORDER — ESCITALOPRAM OXALATE 10 MG/1
10 TABLET ORAL DAILY
Qty: 90 TABLET | Refills: 1 | Status: SHIPPED | OUTPATIENT
Start: 2024-04-12

## 2024-04-12 ASSESSMENT — ENCOUNTER SYMPTOMS
TROUBLE SWALLOWING: 0
COLOR CHANGE: 0
ALLERGIC/IMMUNOLOGIC NEGATIVE: 1
RECTAL PAIN: 0
ABDOMINAL PAIN: 0
CHEST TIGHTNESS: 0
VOICE CHANGE: 0
EYES NEGATIVE: 1
SINUS PAIN: 0
GASTROINTESTINAL NEGATIVE: 1
NAUSEA: 0
SORE THROAT: 0
APNEA: 0
SHORTNESS OF BREATH: 0
DIARRHEA: 0
EYE PAIN: 0
BLOOD IN STOOL: 0
SINUS PRESSURE: 0
ABDOMINAL DISTENTION: 0
RHINORRHEA: 0
ANAL BLEEDING: 0
STRIDOR: 0
COUGH: 0
EYE DISCHARGE: 0
BACK PAIN: 0
VOMITING: 0
WHEEZING: 0
RESPIRATORY NEGATIVE: 1
FACIAL SWELLING: 0
CHOKING: 0

## 2024-04-12 NOTE — PROGRESS NOTES
[place] [address]  Phone: [phone] Fax [phone]  Michele Tamez MS, APRN, FNP-C  4/12/2024    Mona Borrero is a 23 y.o. female who was seen by synchronous (real-time) audio-video technology on 4/12/2024 for Follow-up (Pt for VV f/u today to recheck anxiety/depression. Pt reports taking medication/following POC as directed. Please see ROS/Assessment and Plan section for full details of all items addressed during today's appointment. LMP-4/6/24 per pt)        Assessment & Plan:     1. Anxiety with depression  Pt has anxiety with depression. Pt reports taking Lexapro 10 mg po daily as directed and reports tolerating the medication well without issue. Pt reports feeling considerably better. Pt reports that her anxious moods have improved and she is worrying far less. Pt reports that she is less emotionally labile and reports having fewer crying/sad spells-pt only reports one in past month or so, but was able to talk herself down from it. Pt denies any panic attacks. Pt denies any SI, HI, hallucinations. Pt reports hearing from the counselor but has not yet called them back to schedule. Discussed with pt. Will continue/refill Lexapro at same dose. Pt agrees to call counselor back to schedule as I feel counseling will really benefit the pt. Pt to f/u with me in office in 4 months to recheck. Pt agrees to call sooner for concerns/new or worsening symptoms. Will monitor.   - escitalopram (LEXAPRO) 10 MG tablet; Take 1 tablet by mouth daily  Dispense: 90 tablet; Refill:1  - TSH; Future  - T4, Free; Future    2. Laboratory exam ordered as part of routine general medical examination  Pt to have following fasting labs drawn prior to in office f/u with me in 4 months for routine physical. Will discuss results with pt at next appointment.   - CBC with Auto Differential; Future  - Comprehensive Metabolic Panel; Future  - Lipid Panel; Future  - TSH; Future  - T4,

## 2024-05-01 ENCOUNTER — HOSPITAL ENCOUNTER (EMERGENCY)
Age: 24
Discharge: HOME | End: 2024-05-01
Payer: COMMERCIAL

## 2024-05-01 VITALS
HEIGHT: 61 IN | SYSTOLIC BLOOD PRESSURE: 132 MMHG | DIASTOLIC BLOOD PRESSURE: 86 MMHG | BODY MASS INDEX: 26.62 KG/M2 | WEIGHT: 141 LBS | TEMPERATURE: 99.3 F | RESPIRATION RATE: 17 BRPM | OXYGEN SATURATION: 100 % | HEART RATE: 60 BPM

## 2024-05-01 DIAGNOSIS — S46.812A TRAPEZIUS STRAIN, LEFT, INITIAL ENCOUNTER: Primary | ICD-10-CM

## 2024-05-01 LAB — HCG UR QL: NEGATIVE

## 2024-05-01 PROCEDURE — 81025 URINE PREGNANCY TEST: CPT

## 2024-05-01 PROCEDURE — 96372 THER/PROPH/DIAG INJ SC/IM: CPT

## 2024-05-01 PROCEDURE — 6360000002 HC RX W HCPCS: Performed by: NURSE PRACTITIONER

## 2024-05-01 PROCEDURE — 6370000000 HC RX 637 (ALT 250 FOR IP): Performed by: NURSE PRACTITIONER

## 2024-05-01 PROCEDURE — 99284 EMERGENCY DEPT VISIT MOD MDM: CPT

## 2024-05-01 RX ORDER — ACETAMINOPHEN 500 MG
1000 TABLET ORAL
Status: DISCONTINUED | OUTPATIENT
Start: 2024-05-01 | End: 2024-05-01

## 2024-05-01 RX ORDER — KETOROLAC TROMETHAMINE 15 MG/ML
15 INJECTION, SOLUTION INTRAMUSCULAR; INTRAVENOUS ONCE
Status: COMPLETED | OUTPATIENT
Start: 2024-05-01 | End: 2024-05-01

## 2024-05-01 RX ORDER — PREDNISONE 20 MG/1
40 TABLET ORAL DAILY
Qty: 8 TABLET | Refills: 0 | Status: SHIPPED | OUTPATIENT
Start: 2024-05-01 | End: 2024-05-05

## 2024-05-01 RX ORDER — PREDNISONE 10 MG/1
40 TABLET ORAL
Status: COMPLETED | OUTPATIENT
Start: 2024-05-01 | End: 2024-05-01

## 2024-05-01 RX ORDER — IBUPROFEN 600 MG/1
600 TABLET ORAL EVERY 8 HOURS PRN
Qty: 12 TABLET | Refills: 0 | Status: SHIPPED | OUTPATIENT
Start: 2024-05-01 | End: 2024-05-05

## 2024-05-01 RX ORDER — ACETAMINOPHEN 500 MG
1000 TABLET ORAL
Status: COMPLETED | OUTPATIENT
Start: 2024-05-01 | End: 2024-05-01

## 2024-05-01 RX ADMIN — PREDNISONE 40 MG: 10 TABLET ORAL at 19:31

## 2024-05-01 RX ADMIN — ACETAMINOPHEN 1000 MG: 500 TABLET ORAL at 18:42

## 2024-05-01 RX ADMIN — KETOROLAC TROMETHAMINE 15 MG: 15 INJECTION, SOLUTION INTRAMUSCULAR; INTRAVENOUS at 19:33

## 2024-05-01 ASSESSMENT — PAIN SCALES - GENERAL
PAINLEVEL_OUTOF10: 0
PAINLEVEL_OUTOF10: 7
PAINLEVEL_OUTOF10: 7

## 2024-05-01 ASSESSMENT — LIFESTYLE VARIABLES
HOW MANY STANDARD DRINKS CONTAINING ALCOHOL DO YOU HAVE ON A TYPICAL DAY: 1 OR 2
HOW OFTEN DO YOU HAVE A DRINK CONTAINING ALCOHOL: MONTHLY OR LESS

## 2024-05-01 ASSESSMENT — PAIN - FUNCTIONAL ASSESSMENT: PAIN_FUNCTIONAL_ASSESSMENT: NONE - DENIES PAIN

## 2024-05-01 NOTE — ED TRIAGE NOTES
Pt ambulatory into triage with mother in waiting room. Pt reports she believes she pulled something on left side of neck. Pt reports she cannot turn her head to the left. Pt reports her neck started hurting this morning.

## 2024-05-01 NOTE — ED NOTES
Patient mobility status  with no difficulty. Provider aware     I have reviewed discharge instructions with the patient.  The patient verbalized understanding.    Patient left ED via Discharge Method: ambulatory to Home with  family .    Opportunity for questions and clarification provided.     Patient given 2 scripts.

## 2024-05-02 ENCOUNTER — TELEPHONE (OUTPATIENT)
Dept: FAMILY MEDICINE CLINIC | Facility: CLINIC | Age: 24
End: 2024-05-02

## 2024-05-02 RX ORDER — ALBUTEROL SULFATE 90 UG/1
2 AEROSOL, METERED RESPIRATORY (INHALATION) EVERY 6 HOURS PRN
COMMUNITY
End: 2024-05-02 | Stop reason: SDUPTHER

## 2024-05-02 RX ORDER — ALBUTEROL SULFATE 90 UG/1
2 AEROSOL, METERED RESPIRATORY (INHALATION) EVERY 6 HOURS PRN
Qty: 18 G | Refills: 5 | Status: SHIPPED | OUTPATIENT
Start: 2024-05-02

## 2024-05-02 NOTE — ED PROVIDER NOTES
Emergency Department Provider Note       PCP: Michele Tamez APRN - NP   Age: 23 y.o.   Sex: female     DISPOSITION Decision To Discharge 05/01/2024 07:22:15 PM       ICD-10-CM    1. Trapezius strain, left, initial encounter  S46.812A           Medical Decision Making     As in HPI.  23-year-old female reporting pain in the left trapezius injury while closing a door.  Injury called this morning, she missed work because of this.  No history of spinal surgery weight loss fever chills recent illness or other complaint.  No numbness tingling weakness dizziness lightheadedness, headache visual change or other complaint.  She has no reproducible pain in the ED, stating that she has pain when she turns her head a certain way but otherwise able to complete all range of motion without limitation she has no nuchal rigidity neck is supple, there is no bogginess.  She has no tenderness.  She has no focal deficits.  No whooshing sensation, no pulsatile sensation.  She has negative urine hCG.  Have low clinical suspicion this time for ectopic pregnancy, sepsis, dissection, stroke, CVA, subarachnoid hemorrhage, acute cord compression, spinal injury, vertebral fracture, epidural abscess or other acute emergent process.  Suspecting muscle strain will treat with NSAIDs, short course of oral steroids.  Symptoms improved after NSAID and oral steroid in the ED.  Feel stable for discharge home.  Given strict turn precautions  Patient is well-hydrated appearing, no distress.  Nontoxic-appearing, tolerating oral intake, hemodynamically stable. All findings and plan were discussed with the patient. All questions answered. Discussed with the patient that an unremarkable evaluation in the ED does not preclude the development or presence of a serious or life threatening condition. Patient was instructed to return immediately for any worsening or change in current symptoms, or if symptoms do not continue to improve. I instructed them

## 2024-05-02 NOTE — TELEPHONE ENCOUNTER
----- Message from Mona Borrero sent at 5/2/2024  2:49 PM EDT -----  Regarding: Albuterol refill  Contact: [phone]  Good afternoon, am I able to get a refill on my inhaler? I am almost out and I am having to use it more often now.

## 2024-10-03 DIAGNOSIS — F41.8 ANXIETY WITH DEPRESSION: ICD-10-CM

## 2024-10-24 RX ORDER — ESCITALOPRAM OXALATE 10 MG/1
10 TABLET ORAL DAILY
Qty: 90 TABLET | Refills: 0 | Status: SHIPPED | OUTPATIENT
Start: 2024-10-24

## 2024-11-26 NOTE — ANESTHESIA PROCEDURE NOTES
Epidural Block Start time: 2/11/2019 11:12 AM 
End time: 2/11/2019 11:18 AM 
Performed by: Bruce Jama MD 
Authorized by: Bruce Jama MD  
 
Pre-Procedure Indication: at surgeon's request, post-op pain management, procedure for pain and labor epidural   
Preanesthetic Checklist: patient identified, risks and benefits discussed, anesthesia consent, site marked, patient being monitored, timeout performed and anesthesia consent Timeout Time: 11:11 Epidural:  
Patient position:  Seated Prep region:  Lumbar Prep: Chlorhexidine Location:  L3-4 Needle and Epidural Catheter:  
Needle Type:  Tuohy Needle Gauge:  17 G Injection Technique:  Loss of resistance using saline Attempts:  1 Catheter Size:  19 G Catheter at Skin Depth (cm):  12 Depth in Epidural Space (cm):  7 Events: no blood with aspiration, no cerebrospinal fluid with aspiration, no paresthesia and negative aspiration test   
Test Dose:  Lidocaine 1.5% w/ epi and negative Assessment:  
Catheter Secured:  Tegaderm and tape Insertion:  Uncomplicated Patient tolerance:  Patient tolerated the procedure well with no immediate complications No

## 2025-07-27 ENCOUNTER — HOSPITAL ENCOUNTER (EMERGENCY)
Age: 25
Discharge: HOME | End: 2025-07-27
Attending: EMERGENCY MEDICINE

## 2025-07-27 ENCOUNTER — APPOINTMENT (OUTPATIENT)
Dept: GENERAL RADIOLOGY | Age: 25
End: 2025-07-27

## 2025-07-27 VITALS
WEIGHT: 159.2 LBS | SYSTOLIC BLOOD PRESSURE: 130 MMHG | BODY MASS INDEX: 30.06 KG/M2 | HEART RATE: 79 BPM | HEIGHT: 61 IN | OXYGEN SATURATION: 99 % | RESPIRATION RATE: 16 BRPM | DIASTOLIC BLOOD PRESSURE: 83 MMHG | TEMPERATURE: 98.5 F

## 2025-07-27 DIAGNOSIS — M25.531 RIGHT WRIST PAIN: Primary | ICD-10-CM

## 2025-07-27 PROCEDURE — 73110 X-RAY EXAM OF WRIST: CPT

## 2025-07-27 PROCEDURE — 99283 EMERGENCY DEPT VISIT LOW MDM: CPT

## 2025-07-27 RX ORDER — NAPROXEN 500 MG/1
500 TABLET ORAL 2 TIMES DAILY WITH MEALS
Qty: 14 TABLET | Refills: 0 | Status: SHIPPED | OUTPATIENT
Start: 2025-07-27 | End: 2025-08-03

## 2025-07-27 ASSESSMENT — PAIN SCALES - GENERAL
PAINLEVEL_OUTOF10: 3
PAINLEVEL_OUTOF10: 3

## 2025-07-27 ASSESSMENT — PAIN - FUNCTIONAL ASSESSMENT: PAIN_FUNCTIONAL_ASSESSMENT: 0-10

## 2025-07-27 ASSESSMENT — PAIN DESCRIPTION - ORIENTATION: ORIENTATION: RIGHT

## 2025-07-27 ASSESSMENT — PAIN DESCRIPTION - LOCATION: LOCATION: WRIST

## 2025-07-27 NOTE — ED PROVIDER NOTES
Emergency Department Provider Note       [facility] EMERGENCY DEPT   PCP: Aleyda, Pcp   Age: 25 y.o.   Sex: female     DISPOSITION Decision To Discharge 07/27/2025 02:43:44 PM    ICD-10-CM    1. Right wrist pain  M25.531 [facility]          Medical Decision Making     25-year-old female presents with complaint of right wrist pain has been present for around 1 week.  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  Denies any numbness, tingling, weakness.  Denies any skin color changes.  Patient with brace on at this time.  X-ray with no acute findings.  No fracture.  Patient denies possibility of being pregnant at this time.    Vital signs stable.    Right wrist with mild tenderness palpation.  Positive Phalen sign.  Normal sensory exam.   strength 5 out of 5.  X-ray with no acute or concerning findings noted.  Will discharge home with naproxen.  Patient struck to rest, ice, elevate.  I will discharge home with Velcro wrist splint with instructions for close follow-up with Ortho.  Concern for possible carpal tunnel syndrome.  Patient given return precautions.    ED Course as of 07/27/25 2109   Sun Jul 27, 2025   1435 X-ray R wrist    FINDINGS: There is no evident fracture or dislocation. Joint spaces are  maintained. There is no suggested avascular necrosis or pathologic lesion of  bone. There is no radiopaque foreign body.     IMPRESSION:  Negative right wrist. If symptoms persist consider MRI.   [DF]      ED Course User Index  [DF] Christiano Delacruz Jr., MD     1 acute, uncomplicated illness or injury.  Over the counter drug management performed.  Prescription drug management performed.  Shared medical decision making was utilized in creating the patients health plan today.  I independently ordered and reviewed each unique test.    I reviewed external records: provider visit note from PCP.       I interpreted the X-rays x-ray of right

## 2025-07-27 NOTE — ED TRIAGE NOTES
Pt ambulatory to triage with steady gait with friend. 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 Pt states she has been taking tylenol and ibuprofen. Pt currently wearing brace on right wrist   na

## 2025-07-27 NOTE — DISCHARGE INSTRUCTIONS
Take NSAIDs as directed.  Wear wrist splint with wrist in neutral position.  Rest, elevate, ice.  Schedule close follow-up with orthopedic surgery.  Please return to ED if symptoms worsen or progress in any way including worsening pain or if you develop any redness or warmth of joint, fever, chills, numbness, tingling, weakness

## 2025-09-03 ENCOUNTER — HOSPITAL ENCOUNTER (EMERGENCY)
Age: 25
Discharge: HOME | End: 2025-09-03
Attending: EMERGENCY MEDICINE

## 2025-09-03 ENCOUNTER — APPOINTMENT (OUTPATIENT)
Dept: ULTRASOUND IMAGING | Age: 25
End: 2025-09-03

## 2025-09-03 VITALS
DIASTOLIC BLOOD PRESSURE: 86 MMHG | SYSTOLIC BLOOD PRESSURE: 139 MMHG | OXYGEN SATURATION: 99 % | HEIGHT: 62 IN | HEART RATE: 75 BPM | BODY MASS INDEX: 30.43 KG/M2 | TEMPERATURE: 97.9 F | RESPIRATION RATE: 13 BRPM | WEIGHT: 165.34 LBS

## 2025-09-03 DIAGNOSIS — N93.9 ABNORMAL UTERINE BLEEDING (AUB): Primary | ICD-10-CM

## 2025-09-03 LAB
ANION GAP SERPL CALC-SCNC: 12 MMOL/L (ref 7–16)
APPEARANCE UR: CLEAR
BACTERIA URNS QL MICRO: 0 /HPF
BASOPHILS # BLD: 0.04 K/UL (ref 0–0.2)
BASOPHILS NFR BLD: 0.7 % (ref 0–2)
BILIRUB UR QL: NEGATIVE
BUN SERPL-MCNC: 11 MG/DL (ref 6–23)
CALCIUM SERPL-MCNC: 9.2 MG/DL (ref 8.8–10.2)
CHLORIDE SERPL-SCNC: 105 MMOL/L (ref 98–107)
CO2 SERPL-SCNC: 24 MMOL/L (ref 20–29)
COLOR UR: YELLOW
CREAT SERPL-MCNC: 0.68 MG/DL (ref 0.8–1.3)
DIFFERENTIAL METHOD BLD: ABNORMAL
EOSINOPHIL # BLD: 0.19 K/UL (ref 0–0.8)
EOSINOPHIL NFR BLD: 3.3 % (ref 0.5–7.8)
EPI CELLS #/AREA URNS HPF: ABNORMAL /HPF
ERYTHROCYTE [DISTWIDTH] IN BLOOD BY AUTOMATED COUNT: 16.1 % (ref 11.9–14.6)
GLUCOSE SERPL-MCNC: 93 MG/DL (ref 70–99)
GLUCOSE UR STRIP.AUTO-MCNC: NEGATIVE MG/DL
HCG UR QL: NEGATIVE
HCT VFR BLD AUTO: 34.5 % (ref 35.8–46.3)
HGB BLD-MCNC: 11.1 G/DL (ref 11.7–15.4)
HGB UR QL STRIP: ABNORMAL
IMM GRANULOCYTES # BLD AUTO: 0.01 K/UL (ref 0–0.5)
IMM GRANULOCYTES NFR BLD AUTO: 0.2 % (ref 0–5)
KETONES UR QL STRIP.AUTO: NEGATIVE MG/DL
LEUKOCYTE ESTERASE UR QL STRIP.AUTO: NEGATIVE
LYMPHOCYTES # BLD: 1.88 K/UL (ref 0.5–4.6)
LYMPHOCYTES NFR BLD: 32.8 % (ref 13–44)
MCH RBC QN AUTO: 24.2 PG (ref 26.1–32.9)
MCHC RBC AUTO-ENTMCNC: 32.2 G/DL (ref 31.4–35)
MCV RBC AUTO: 75.3 FL (ref 82–102)
MONOCYTES # BLD: 0.5 K/UL (ref 0.1–1.3)
MONOCYTES NFR BLD: 8.7 % (ref 4–12)
MUCOUS THREADS URNS QL MICRO: 0 /LPF
NEUTS SEG # BLD: 3.12 K/UL (ref 1.7–8.2)
NEUTS SEG NFR BLD: 54.3 % (ref 43–78)
NITRITE UR QL STRIP.AUTO: NEGATIVE
NRBC # BLD: 0 K/UL (ref 0–0.2)
OTHER OBSERVATIONS: ABNORMAL
PH UR STRIP: 6 (ref 5–9)
PLATELET # BLD AUTO: 272 K/UL (ref 150–450)
PMV BLD AUTO: 9.2 FL (ref 9.4–12.3)
POTASSIUM SERPL-SCNC: 4.1 MMOL/L (ref 3.5–5.1)
PROT UR STRIP-MCNC: NEGATIVE MG/DL
RBC # BLD AUTO: 4.58 M/UL (ref 4.05–5.2)
RBC #/AREA URNS HPF: ABNORMAL /HPF
SODIUM SERPL-SCNC: 141 MMOL/L (ref 133–143)
SP GR UR REFRACTOMETRY: >=1.03 (ref 1–1.02)
URINE CULTURE IF INDICATED: ABNORMAL
UROBILINOGEN UR QL STRIP.AUTO: 0.2 EU/DL (ref 0.2–1)
WBC # BLD AUTO: 5.7 K/UL (ref 4.3–11.1)
WBC URNS QL MICRO: 0 /HPF

## 2025-09-03 PROCEDURE — 85025 COMPLETE CBC W/AUTO DIFF WBC: CPT

## 2025-09-03 PROCEDURE — 81025 URINE PREGNANCY TEST: CPT

## 2025-09-03 PROCEDURE — 76830 TRANSVAGINAL US NON-OB: CPT

## 2025-09-03 PROCEDURE — 80048 BASIC METABOLIC PNL TOTAL CA: CPT

## 2025-09-03 PROCEDURE — 81001 URINALYSIS AUTO W/SCOPE: CPT

## 2025-09-03 PROCEDURE — 99284 EMERGENCY DEPT VISIT MOD MDM: CPT

## 2025-09-03 ASSESSMENT — LIFESTYLE VARIABLES
HOW MANY STANDARD DRINKS CONTAINING ALCOHOL DO YOU HAVE ON A TYPICAL DAY: PATIENT DOES NOT DRINK
HOW OFTEN DO YOU HAVE A DRINK CONTAINING ALCOHOL: NEVER

## 2025-09-03 ASSESSMENT — PAIN SCALES - GENERAL: PAINLEVEL_OUTOF10: 0

## 2025-09-03 ASSESSMENT — PAIN - FUNCTIONAL ASSESSMENT: PAIN_FUNCTIONAL_ASSESSMENT: 0-10
